# Patient Record
Sex: FEMALE | Race: WHITE
[De-identification: names, ages, dates, MRNs, and addresses within clinical notes are randomized per-mention and may not be internally consistent; named-entity substitution may affect disease eponyms.]

---

## 2017-10-18 ENCOUNTER — HOSPITAL ENCOUNTER (OUTPATIENT)
Dept: HOSPITAL 47 - LABWHC1 | Age: 52
Discharge: HOME | End: 2017-10-18
Payer: COMMERCIAL

## 2017-10-18 DIAGNOSIS — D72.829: Primary | ICD-10-CM

## 2017-10-18 LAB
ALP SERPL-CCNC: 92 U/L (ref 38–126)
ALT SERPL-CCNC: 46 U/L (ref 9–52)
ANION GAP SERPL CALC-SCNC: 11 MMOL/L
AST SERPL-CCNC: 22 U/L (ref 14–36)
BASOPHILS # BLD AUTO: 0.1 K/UL (ref 0–0.2)
BASOPHILS NFR BLD AUTO: 1 %
BUN SERPL-SCNC: 11 MG/DL (ref 7–17)
CALCIUM SPEC-MCNC: 9.7 MG/DL (ref 8.4–10.2)
CH: 31.2
CHCM: 31.8
CHLORIDE SERPL-SCNC: 102 MMOL/L (ref 98–107)
CO2 SERPL-SCNC: 22 MMOL/L (ref 22–30)
EOSINOPHIL # BLD AUTO: 0.5 K/UL (ref 0–0.7)
EOSINOPHIL NFR BLD AUTO: 4 %
ERYTHROCYTE [DISTWIDTH] IN BLOOD BY AUTOMATED COUNT: 4.4 M/UL (ref 3.8–5.4)
ERYTHROCYTE [DISTWIDTH] IN BLOOD: 13.6 % (ref 11.5–15.5)
GLUCOSE SERPL-MCNC: 199 MG/DL (ref 74–99)
HCT VFR BLD AUTO: 43.4 % (ref 34–46)
HDW: 2.37
HGB BLD-MCNC: 13.5 GM/DL (ref 11.4–16)
LUC NFR BLD AUTO: 2 %
LYMPHOCYTES # SPEC AUTO: 3.4 K/UL (ref 1–4.8)
LYMPHOCYTES NFR SPEC AUTO: 25 %
MCH RBC QN AUTO: 30.7 PG (ref 25–35)
MCHC RBC AUTO-ENTMCNC: 31.1 G/DL (ref 31–37)
MCV RBC AUTO: 98.7 FL (ref 80–100)
MONOCYTES # BLD AUTO: 0.9 K/UL (ref 0–1)
MONOCYTES NFR BLD AUTO: 7 %
NEUTROPHILS # BLD AUTO: 8.5 K/UL (ref 1.3–7.7)
NEUTROPHILS NFR BLD AUTO: 62 %
NON-AFRICAN AMERICAN GFR(MDRD): >60
POTASSIUM SERPL-SCNC: 4.1 MMOL/L (ref 3.5–5.1)
PROT SERPL-MCNC: 6.5 G/DL (ref 6.3–8.2)
SODIUM SERPL-SCNC: 135 MMOL/L (ref 137–145)
WBC # BLD AUTO: 0.33 10*3/UL
WBC # BLD AUTO: 13.7 K/UL (ref 3.8–10.6)
WBC (PEROX): 13.73

## 2017-10-18 PROCEDURE — 80053 COMPREHEN METABOLIC PANEL: CPT

## 2017-10-18 PROCEDURE — 36415 COLL VENOUS BLD VENIPUNCTURE: CPT

## 2017-10-18 PROCEDURE — 85025 COMPLETE CBC W/AUTO DIFF WBC: CPT

## 2018-11-09 ENCOUNTER — HOSPITAL ENCOUNTER (OUTPATIENT)
Dept: HOSPITAL 47 - LABWHC1 | Age: 53
Discharge: HOME | End: 2018-11-09
Attending: NURSE PRACTITIONER
Payer: COMMERCIAL

## 2018-11-09 DIAGNOSIS — R79.9: ICD-10-CM

## 2018-11-09 DIAGNOSIS — D72.829: Primary | ICD-10-CM

## 2018-11-09 LAB
BASOPHILS # BLD AUTO: 0.1 K/UL (ref 0–0.2)
BASOPHILS NFR BLD AUTO: 1 %
EOSINOPHIL # BLD AUTO: 0.4 K/UL (ref 0–0.7)
EOSINOPHIL NFR BLD AUTO: 3 %
ERYTHROCYTE [DISTWIDTH] IN BLOOD BY AUTOMATED COUNT: 4.58 M/UL (ref 3.8–5.4)
ERYTHROCYTE [DISTWIDTH] IN BLOOD: 13.7 % (ref 11.5–15.5)
HCT VFR BLD AUTO: 44 % (ref 34–46)
HGB BLD-MCNC: 14.2 GM/DL (ref 11.4–16)
LYMPHOCYTES # SPEC AUTO: 3.3 K/UL (ref 1–4.8)
LYMPHOCYTES NFR SPEC AUTO: 25 %
MCH RBC QN AUTO: 30.9 PG (ref 25–35)
MCHC RBC AUTO-ENTMCNC: 32.2 G/DL (ref 31–37)
MCV RBC AUTO: 96 FL (ref 80–100)
MONOCYTES # BLD AUTO: 0.8 K/UL (ref 0–1)
MONOCYTES NFR BLD AUTO: 6 %
NEUTROPHILS # BLD AUTO: 8.3 K/UL (ref 1.3–7.7)
NEUTROPHILS NFR BLD AUTO: 62 %
PLATELET # BLD AUTO: 336 K/UL (ref 150–450)
WBC # BLD AUTO: 13.3 K/UL (ref 3.8–10.6)

## 2018-11-09 PROCEDURE — 36415 COLL VENOUS BLD VENIPUNCTURE: CPT

## 2018-11-09 PROCEDURE — 85025 COMPLETE CBC W/AUTO DIFF WBC: CPT

## 2018-11-12 ENCOUNTER — HOSPITAL ENCOUNTER (OUTPATIENT)
Dept: HOSPITAL 47 - RADCTMAIN | Age: 53
Discharge: HOME | End: 2018-11-12
Attending: INTERNAL MEDICINE
Payer: COMMERCIAL

## 2018-11-12 DIAGNOSIS — Z88.8: ICD-10-CM

## 2018-11-12 DIAGNOSIS — R91.8: Primary | ICD-10-CM

## 2018-11-12 LAB — BUN SERPL-SCNC: 11 MG/DL (ref 7–17)

## 2018-11-12 PROCEDURE — 36415 COLL VENOUS BLD VENIPUNCTURE: CPT

## 2018-11-12 PROCEDURE — 84520 ASSAY OF UREA NITROGEN: CPT

## 2018-11-12 PROCEDURE — 82565 ASSAY OF CREATININE: CPT

## 2018-11-12 PROCEDURE — 71260 CT THORAX DX C+: CPT

## 2018-11-12 NOTE — CT
EXAMINATION TYPE: CT chest w con

 

DATE OF EXAM: 11/12/2018

 

COMPARISON: 10/27/2017

 

HISTORY: Pulmonary nodule

 

CT DLP: 627 mGycm, Automated exposure control for dose reduction was used.

 

CONTRAST: Performed injected with 100 mL of Isovue 300.

 

TECHNIQUE: Axial images were obtained at 5 mm thick sections.  Reconstructed images are reviewed on [employer] computer in the coronal plane. 

 

FINDINGS: Portion of the thyroid visualized is normal.

 

No suspicious infiltrates are evident. There is a 0.6 cm peripheral nodule series 3 image 25. There i
s a 0.5 cm pleural-based nodule. Series 3 image 40. Findings appear stable.

 

No enlarged mediastinal or hilar adenopathy is evident.   Subcentimeter lymph nodes are stable. The a
scending aorta diameter at the level of the main pulmonary artery is 2.9 cm.  The main pulmonary erin
ry diameter at the bifurcation is 2.7 cm.

 

Limited CT sections are obtained through the upper abdomen. Abdomen is essentially unremarkable.

 

IMPRESSIONS:

1. Stable subcentimeter nodules right lung. Follow-up exam can be performed in 6 months to confirm st
ability.

## 2019-04-17 ENCOUNTER — HOSPITAL ENCOUNTER (OUTPATIENT)
Dept: HOSPITAL 47 - LABWHC1 | Age: 54
Discharge: HOME | End: 2019-04-17
Attending: FAMILY MEDICINE
Payer: COMMERCIAL

## 2019-04-17 DIAGNOSIS — I10: ICD-10-CM

## 2019-04-17 DIAGNOSIS — Z00.00: Primary | ICD-10-CM

## 2019-04-17 DIAGNOSIS — Z79.4: ICD-10-CM

## 2019-04-17 DIAGNOSIS — E11.9: ICD-10-CM

## 2019-04-17 DIAGNOSIS — Z79.899: ICD-10-CM

## 2019-04-17 LAB
ALBUMIN SERPL-MCNC: 4.3 G/DL (ref 3.8–4.9)
ALBUMIN/GLOB SERPL: 2.87 G/DL (ref 1.6–3.17)
ALP SERPL-CCNC: 78 U/L (ref 41–126)
ALT SERPL-CCNC: 26 U/L (ref 8–44)
ANION GAP SERPL CALC-SCNC: 7.5 MMOL/L (ref 4–12)
AST SERPL-CCNC: 19 U/L (ref 13–35)
BASOPHILS # BLD AUTO: 0.1 K/UL (ref 0–0.2)
BASOPHILS NFR BLD AUTO: 0 %
BUN SERPL-SCNC: 13 MG/DL (ref 9–27)
CALCIUM SPEC-MCNC: 9.4 MG/DL (ref 8.7–10.3)
CHLORIDE SERPL-SCNC: 108 MMOL/L (ref 96–109)
CHOLEST SERPL-MCNC: 135 MG/DL (ref 0–200)
CO2 SERPL-SCNC: 24.5 MMOL/L (ref 21.6–31.8)
EOSINOPHIL # BLD AUTO: 0.4 K/UL (ref 0–0.7)
EOSINOPHIL NFR BLD AUTO: 3 %
ERYTHROCYTE [DISTWIDTH] IN BLOOD BY AUTOMATED COUNT: 4.67 M/UL (ref 3.8–5.4)
ERYTHROCYTE [DISTWIDTH] IN BLOOD: 14.3 % (ref 11.5–15.5)
GLOBULIN SER CALC-MCNC: 1.5 G/DL (ref 1.6–3.3)
GLUCOSE SERPL-MCNC: 142 MG/DL (ref 70–110)
HBA1C MFR BLD: 7.6 % (ref 4–6)
HCT VFR BLD AUTO: 44.6 % (ref 34–46)
HDLC SERPL-MCNC: 32 MG/DL (ref 40–60)
HGB BLD-MCNC: 14.3 GM/DL (ref 11.4–16)
LDLC SERPL CALC-MCNC: 70.4 MG/DL (ref 0–131)
LYMPHOCYTES # SPEC AUTO: 3.1 K/UL (ref 1–4.8)
LYMPHOCYTES NFR SPEC AUTO: 21 %
MCH RBC QN AUTO: 30.5 PG (ref 25–35)
MCHC RBC AUTO-ENTMCNC: 31.9 G/DL (ref 31–37)
MCV RBC AUTO: 95.6 FL (ref 80–100)
MONOCYTES # BLD AUTO: 0.8 K/UL (ref 0–1)
MONOCYTES NFR BLD AUTO: 6 %
NEUTROPHILS # BLD AUTO: 9.8 K/UL (ref 1.3–7.7)
NEUTROPHILS NFR BLD AUTO: 68 %
PLATELET # BLD AUTO: 335 K/UL (ref 150–450)
POTASSIUM SERPL-SCNC: 4.1 MMOL/L (ref 3.5–5.5)
PROT SERPL-MCNC: 5.8 G/DL (ref 6.2–8.2)
SODIUM SERPL-SCNC: 140 MMOL/L (ref 135–145)
T4 FREE SERPL-MCNC: 1.4 NG/DL (ref 0.8–1.8)
TRIGL SERPL-MCNC: 163 MG/DL (ref 0–149)
VLDLC SERPL CALC-MCNC: 32.6 MG/DL (ref 5–40)
WBC # BLD AUTO: 14.5 K/UL (ref 3.8–10.6)

## 2019-04-17 PROCEDURE — 83036 HEMOGLOBIN GLYCOSYLATED A1C: CPT

## 2019-04-17 PROCEDURE — 80053 COMPREHEN METABOLIC PANEL: CPT

## 2019-04-17 PROCEDURE — 84443 ASSAY THYROID STIM HORMONE: CPT

## 2019-04-17 PROCEDURE — 36415 COLL VENOUS BLD VENIPUNCTURE: CPT

## 2019-04-17 PROCEDURE — 85025 COMPLETE CBC W/AUTO DIFF WBC: CPT

## 2019-04-17 PROCEDURE — 80061 LIPID PANEL: CPT

## 2019-04-17 PROCEDURE — 84439 ASSAY OF FREE THYROXINE: CPT

## 2019-09-18 ENCOUNTER — HOSPITAL ENCOUNTER (OUTPATIENT)
Dept: HOSPITAL 47 - LABWHC1 | Age: 54
Discharge: HOME | End: 2019-09-18
Attending: CLINICAL NURSE SPECIALIST
Payer: COMMERCIAL

## 2019-09-18 DIAGNOSIS — F31.9: Primary | ICD-10-CM

## 2019-09-18 LAB
ALBUMIN SERPL-MCNC: 4.4 G/DL (ref 3.8–4.9)
ALBUMIN/GLOB SERPL: 2.44 G/DL (ref 1.6–3.17)
ALP SERPL-CCNC: 89 U/L (ref 41–126)
ALT SERPL-CCNC: 40 U/L (ref 8–44)
ANION GAP SERPL CALC-SCNC: 6.9 MMOL/L (ref 4–12)
AST SERPL-CCNC: 25 U/L (ref 13–35)
BASOPHILS # BLD AUTO: 0.2 K/UL (ref 0–0.2)
BASOPHILS NFR BLD AUTO: 1 %
BUN SERPL-SCNC: 18 MG/DL (ref 9–27)
BUN/CREAT SERPL: 18 RATIO (ref 12–20)
CALCIUM SPEC-MCNC: 9.5 MG/DL (ref 8.7–10.3)
CHLORIDE SERPL-SCNC: 108 MMOL/L (ref 96–109)
CHOLEST SERPL-MCNC: 148 MG/DL (ref 0–200)
CO2 SERPL-SCNC: 24.1 MMOL/L (ref 21.6–31.8)
EOSINOPHIL # BLD AUTO: 0.9 K/UL (ref 0–0.7)
EOSINOPHIL NFR BLD AUTO: 6 %
ERYTHROCYTE [DISTWIDTH] IN BLOOD BY AUTOMATED COUNT: 4.92 M/UL (ref 3.8–5.4)
ERYTHROCYTE [DISTWIDTH] IN BLOOD: 13.7 % (ref 11.5–15.5)
GLOBULIN SER CALC-MCNC: 1.8 G/DL (ref 1.6–3.3)
GLUCOSE SERPL-MCNC: 147 MG/DL (ref 70–110)
HBA1C MFR BLD: 7.1 % (ref 4–6)
HCT VFR BLD AUTO: 46.5 % (ref 34–46)
HDLC SERPL-MCNC: 38 MG/DL (ref 40–60)
HGB BLD-MCNC: 15.6 GM/DL (ref 11.4–16)
LDLC SERPL CALC-MCNC: 72.2 MG/DL (ref 0–131)
LYMPHOCYTES # SPEC AUTO: 3.1 K/UL (ref 1–4.8)
LYMPHOCYTES NFR SPEC AUTO: 21 %
MCH RBC QN AUTO: 31.8 PG (ref 25–35)
MCHC RBC AUTO-ENTMCNC: 33.6 G/DL (ref 31–37)
MCV RBC AUTO: 94.5 FL (ref 80–100)
MONOCYTES # BLD AUTO: 0.9 K/UL (ref 0–1)
MONOCYTES NFR BLD AUTO: 6 %
NEUTROPHILS # BLD AUTO: 9.4 K/UL (ref 1.3–7.7)
NEUTROPHILS NFR BLD AUTO: 64 %
PLATELET # BLD AUTO: 384 K/UL (ref 150–450)
POTASSIUM SERPL-SCNC: 4.4 MMOL/L (ref 3.5–5.5)
PROT SERPL-MCNC: 6.2 G/DL (ref 6.2–8.2)
SODIUM SERPL-SCNC: 139 MMOL/L (ref 135–145)
T3RU NFR SERPL: 29 % (ref 23–37)
T4 FREE SERPL-MCNC: 1.3 NG/DL (ref 0.8–1.8)
TRIGL SERPL-MCNC: 189 MG/DL (ref 0–149)
VLDLC SERPL CALC-MCNC: 37.8 MG/DL (ref 5–40)
WBC # BLD AUTO: 14.8 K/UL (ref 3.8–10.6)

## 2019-09-18 PROCEDURE — 82248 BILIRUBIN DIRECT: CPT

## 2019-09-18 PROCEDURE — 36415 COLL VENOUS BLD VENIPUNCTURE: CPT

## 2019-09-18 PROCEDURE — 84443 ASSAY THYROID STIM HORMONE: CPT

## 2019-09-18 PROCEDURE — 82306 VITAMIN D 25 HYDROXY: CPT

## 2019-09-18 PROCEDURE — 93005 ELECTROCARDIOGRAM TRACING: CPT

## 2019-09-18 PROCEDURE — 80061 LIPID PANEL: CPT

## 2019-09-18 PROCEDURE — 84479 ASSAY OF THYROID (T3 OR T4): CPT

## 2019-09-18 PROCEDURE — 80053 COMPREHEN METABOLIC PANEL: CPT

## 2019-09-18 PROCEDURE — 85025 COMPLETE CBC W/AUTO DIFF WBC: CPT

## 2019-09-18 PROCEDURE — 84146 ASSAY OF PROLACTIN: CPT

## 2019-09-18 PROCEDURE — 84439 ASSAY OF FREE THYROXINE: CPT

## 2019-09-18 PROCEDURE — 83036 HEMOGLOBIN GLYCOSYLATED A1C: CPT

## 2019-10-08 ENCOUNTER — HOSPITAL ENCOUNTER (OUTPATIENT)
Dept: HOSPITAL 47 - RADMAMWWP | Age: 54
Discharge: HOME | End: 2019-10-08
Attending: FAMILY MEDICINE
Payer: COMMERCIAL

## 2019-10-08 DIAGNOSIS — Z12.31: Primary | ICD-10-CM

## 2019-10-08 DIAGNOSIS — Z78.0: ICD-10-CM

## 2019-10-08 DIAGNOSIS — Z80.3: ICD-10-CM

## 2019-10-08 PROCEDURE — 77067 SCR MAMMO BI INCL CAD: CPT

## 2019-10-08 PROCEDURE — 77063 BREAST TOMOSYNTHESIS BI: CPT

## 2019-10-11 NOTE — MM
Reason for exam: screening  (asymptomatic).

Last mammogram was performed 5 years and 6 months ago.



History:

Patient is postmenopausal.

Family history of breast cancer in maternal aunt.



Physical Findings:

A clinical breast exam by your physician is recommended on an annual basis and 

results should be correlated with mammographic findings.



MG 3D Screening Mammo W/Cad

Bilateral CC and MLO view(s) were taken.

Prior study comparison: March 24, 2014, bilateral digital screening mammo w/CAD.

The breast tissue is heterogeneously dense. This may lower the sensitivity of 

mammography.  No significant changes when compared with prior studies.





ASSESSMENT: Benign, BI-RAD 2



RECOMMENDATION:

Routine screening mammogram of both breasts in 1 year.

## 2019-10-18 ENCOUNTER — HOSPITAL ENCOUNTER (OUTPATIENT)
Dept: HOSPITAL 47 - RADCTMAIN | Age: 54
Discharge: HOME | End: 2019-10-18
Attending: FAMILY MEDICINE
Payer: COMMERCIAL

## 2019-10-18 DIAGNOSIS — G31.1: Primary | ICD-10-CM

## 2019-10-18 DIAGNOSIS — Z88.8: ICD-10-CM

## 2019-10-18 PROCEDURE — 82565 ASSAY OF CREATININE: CPT

## 2019-10-18 PROCEDURE — 84520 ASSAY OF UREA NITROGEN: CPT

## 2019-10-18 PROCEDURE — 70470 CT HEAD/BRAIN W/O & W/DYE: CPT

## 2019-10-18 NOTE — CT
EXAMINATION TYPE: CT brain wo/w con

 

DATE OF EXAM: 10/18/2019

 

COMPARISON: None.

 

HISTORY: Abnormal clinical finding per patient and order.

 

CT DLP: 2180.80 mGycm

Automated exposure control for dose reduction was used.

 

CONTRAST: 

CT scan of the head is performed without and with IV Contrast, patient injected with 80 ml mL of Isov
ue 300.

 

FINDINGS: 

Noncontrast images show no acute intracranial hemorrhage or midline shift. Mild age-related cerebral 
atrophy. Gray-white matter differentiation is maintained. Postcontrast images show no suspicious enha
ncing mass. Hypoplastic right A1 segment with filling of right A2 segment due to patent anterior comm
unicating artery incidentally noted. Normal variant. The globes are intact and the visualized sinuses
 are clear.

 

IMPRESSION: Mild age-related cerebral atrophy. No hydrocephalus or suspicious enhancing mass.

## 2019-12-18 ENCOUNTER — HOSPITAL ENCOUNTER (OUTPATIENT)
Dept: HOSPITAL 47 - RADCTMAIN | Age: 54
Discharge: HOME | End: 2019-12-18
Attending: INTERNAL MEDICINE
Payer: COMMERCIAL

## 2019-12-18 DIAGNOSIS — Z12.2: Primary | ICD-10-CM

## 2019-12-18 DIAGNOSIS — Z87.891: ICD-10-CM

## 2019-12-18 NOTE — CTL
EXAMINATION TYPE:  CT Low Dose Lung

 

DATE OF EXAM ORDERED: 12/18/2019

 

HISTORY: . Lung cancer screening

 

CT DLP: 53 mGycm

CT CTDI: 1.68 mGy

Automated exposure control for dose reduction was used.

 

SCREENING VISIT: Initial

 

COMPARISON: 11/12/2018

 

TECHNIQUE: Low dose computed tomography scan was performed through the chest at 1 mm thick sections a
nd reconstructed images in the coronal plane at 1 mm thick sections.

 

CT DIAGNOSTIC QUALITY: Limited, but interpretable

 

FINDINGS:

LUNG NODULES: None.

 

LUNGS:

COPD: Severity: None

Fibrosis: Severity: None

Lymph nodes: None

Other findings: None

 

RIGHT PLEURAL SPACE:

Effusion: None

Calcification: None

Thickening: None

Pneumothorax: None

 

LEFT PLEURAL SPACE:

Effusion: None

Calcification: None

Thickening: None

Pneumothorax: None

 

HEART:  

Heart Size: Normal

Coronary calcification: Mild

Pericardial effusion: None

 

OTHER FINDINGS:

Upper abdomen: Unremarkable

Bony thorax: Unremarkable

Supraclavicular region: Normal

Other: Ascending thoracic aorta at the level the main pulmonary artery measures 3.5 cm.  The main pul
monary artery at the bifurcation measures 3.0 cm.

 

IMPRESSION: No suspicious acute changes

 

FOLLOW UP CT CHEST RECOMMENDATION: Screening low-dose CT chest for protocol

CT LUNG RAD: 1

## 2020-01-17 ENCOUNTER — HOSPITAL ENCOUNTER (OUTPATIENT)
Dept: HOSPITAL 47 - LABPAT | Age: 55
Discharge: HOME | End: 2020-01-17
Attending: INTERNAL MEDICINE
Payer: COMMERCIAL

## 2020-01-17 DIAGNOSIS — Z01.812: Primary | ICD-10-CM

## 2020-01-17 DIAGNOSIS — R07.2: ICD-10-CM

## 2020-01-17 LAB
ANION GAP SERPL CALC-SCNC: 10 MMOL/L
BASOPHILS # BLD AUTO: 0.1 K/UL (ref 0–0.2)
BASOPHILS NFR BLD AUTO: 1 %
BUN SERPL-SCNC: 15 MG/DL (ref 7–17)
CHLORIDE SERPL-SCNC: 105 MMOL/L (ref 98–107)
CO2 SERPL-SCNC: 25 MMOL/L (ref 22–30)
EOSINOPHIL # BLD AUTO: 0.3 K/UL (ref 0–0.7)
EOSINOPHIL NFR BLD AUTO: 3 %
ERYTHROCYTE [DISTWIDTH] IN BLOOD BY AUTOMATED COUNT: 5.1 M/UL (ref 3.8–5.4)
ERYTHROCYTE [DISTWIDTH] IN BLOOD: 14.1 % (ref 11.5–15.5)
HCT VFR BLD AUTO: 49 % (ref 34–46)
HGB BLD-MCNC: 15.3 GM/DL (ref 11.4–16)
LYMPHOCYTES # SPEC AUTO: 2.1 K/UL (ref 1–4.8)
LYMPHOCYTES NFR SPEC AUTO: 21 %
MCH RBC QN AUTO: 30 PG (ref 25–35)
MCHC RBC AUTO-ENTMCNC: 31.2 G/DL (ref 31–37)
MCV RBC AUTO: 96 FL (ref 80–100)
MONOCYTES # BLD AUTO: 0.8 K/UL (ref 0–1)
MONOCYTES NFR BLD AUTO: 8 %
NEUTROPHILS # BLD AUTO: 6.5 K/UL (ref 1.3–7.7)
NEUTROPHILS NFR BLD AUTO: 65 %
PLATELET # BLD AUTO: 403 K/UL (ref 150–450)
POTASSIUM SERPL-SCNC: 4.4 MMOL/L (ref 3.5–5.1)
SODIUM SERPL-SCNC: 140 MMOL/L (ref 137–145)
WBC # BLD AUTO: 10.1 K/UL (ref 3.8–10.6)

## 2020-01-17 PROCEDURE — 82565 ASSAY OF CREATININE: CPT

## 2020-01-17 PROCEDURE — 80051 ELECTROLYTE PANEL: CPT

## 2020-01-17 PROCEDURE — 84520 ASSAY OF UREA NITROGEN: CPT

## 2020-01-17 PROCEDURE — 85025 COMPLETE CBC W/AUTO DIFF WBC: CPT

## 2020-01-22 ENCOUNTER — HOSPITAL ENCOUNTER (OUTPATIENT)
Dept: HOSPITAL 47 - CATHCVL | Age: 55
End: 2020-01-22
Attending: INTERNAL MEDICINE
Payer: COMMERCIAL

## 2020-01-22 VITALS — TEMPERATURE: 97.6 F

## 2020-01-22 VITALS — BODY MASS INDEX: 26.7 KG/M2

## 2020-01-22 VITALS — SYSTOLIC BLOOD PRESSURE: 114 MMHG | DIASTOLIC BLOOD PRESSURE: 82 MMHG

## 2020-01-22 VITALS — RESPIRATION RATE: 16 BRPM

## 2020-01-22 VITALS — HEART RATE: 71 BPM

## 2020-01-22 DIAGNOSIS — Z82.49: ICD-10-CM

## 2020-01-22 DIAGNOSIS — R79.89: ICD-10-CM

## 2020-01-22 DIAGNOSIS — Z79.890: ICD-10-CM

## 2020-01-22 DIAGNOSIS — F17.210: ICD-10-CM

## 2020-01-22 DIAGNOSIS — R07.89: ICD-10-CM

## 2020-01-22 DIAGNOSIS — Z79.84: ICD-10-CM

## 2020-01-22 DIAGNOSIS — I25.10: Primary | ICD-10-CM

## 2020-01-22 DIAGNOSIS — E11.9: ICD-10-CM

## 2020-01-22 DIAGNOSIS — Z79.899: ICD-10-CM

## 2020-01-22 DIAGNOSIS — Z88.8: ICD-10-CM

## 2020-01-22 DIAGNOSIS — I77.89: ICD-10-CM

## 2020-01-22 DIAGNOSIS — E78.2: ICD-10-CM

## 2020-01-22 DIAGNOSIS — I10: ICD-10-CM

## 2020-01-22 LAB — GLUCOSE BLD-MCNC: 166 MG/DL (ref 75–99)

## 2020-01-22 PROCEDURE — 93458 L HRT ARTERY/VENTRICLE ANGIO: CPT

## 2020-01-22 RX ADMIN — MIDAZOLAM ONE MG: 1 INJECTION INTRAMUSCULAR; INTRAVENOUS at 07:34

## 2020-01-22 RX ADMIN — CEFAZOLIN ONE MLS: 330 INJECTION, POWDER, FOR SOLUTION INTRAMUSCULAR; INTRAVENOUS at 06:25

## 2020-01-22 RX ADMIN — CEFAZOLIN ONE MLS: 330 INJECTION, POWDER, FOR SOLUTION INTRAMUSCULAR; INTRAVENOUS at 06:39

## 2020-01-22 RX ADMIN — MIDAZOLAM ONE MG: 1 INJECTION INTRAMUSCULAR; INTRAVENOUS at 07:35

## 2020-01-22 NOTE — LTR
January 22, 2020





Re: Ayana Henao



Dear Roger:



I performed cardiac catheterization on Ayana Henao. A detailed catheterization note

is enclosed for your records.



In brief, the cardiac catheterization reveals mild nonobstructive coronary artery

disease and her management is going to be in the form of risk factor modification and

optimal medical therapy.



Thank you for giving us the privilege to participate in the care of this pleasant lady.



Sincerely,







MD ADRIAN Forde / BALDEV: [id number] / Job#: [id number]

## 2020-01-22 NOTE — CC
CARDIAC CATHETERIZATION REPORT



INDICATION:

Unstable angina.



The patient with multiple coronary risk factors presented to us with symptoms of

precordial chest pain.  She had a negative stress test a few months ago, but has had

recurrent episodes of chest pressure concerning for unstable angina.  Due to this, I

advised her to undergo cardiac catheterization for definitive diagnosis.  While we were

waiting for insurance approval, she had another episode of chest pain and visited the

ER, but was not evaluated as she could not wait.



PROCEDURE NOTE:

After obtaining informed consent, left heart catheterization and coronary angiogram

were performed via the right femoral artery using standard Nazario catheters. The

patient tolerated the procedure well without any obvious immediate complications.



Patient received moderate conscious sedation.  Total sedation time was 13 minutes.  A

femoral angiogram was performed and Angio-Seal was deployed for hemostasis.



FINDINGS:

1. HEMODYNAMICS: Left ventricular end-diastolic pressure is 18 mm.  There is no

    significant gradient across the aortic valve.

2. LEFT VENTRICULOGRAM: Left ventriculogram is not performed.

3. ANGIOGRAPHIC DATA:

Left Main Coronary Artery: Left main coronary artery is a normal-sized vessel and is

free of stenosis.  Divides into left anterior descending coronary artery and circumflex

coronary artery.

LAD shows a mild nonobstructive plaque in the proximal part.

Circumflex coronary artery shows areas that are irregular and ectatic.

Right coronary artery is a small nondominant vessel that shows mild nonobstructive

disease.



CONCLUSION:

Mild nonobstructive coronary artery disease involving LAD and circumflex coronary

artery.



PLAN:

I reviewed angiographic data with the patient and told her that her chest discomfort is

noncardiac in origin and her management is going to be in the form of risk factor

modification optimal medical therapy.

The patient has elevated creatinine and she has been hydrated prior to cath.





MMODL / IJN: [id number] / Job#: [id number]

## 2020-02-10 ENCOUNTER — HOSPITAL ENCOUNTER (OUTPATIENT)
Dept: HOSPITAL 47 - ORWHC2ENDO | Age: 55
Discharge: HOME | End: 2020-02-10
Attending: SURGERY
Payer: COMMERCIAL

## 2020-02-10 VITALS — TEMPERATURE: 98.6 F

## 2020-02-10 VITALS — BODY MASS INDEX: 26.7 KG/M2

## 2020-02-10 VITALS — RESPIRATION RATE: 18 BRPM | SYSTOLIC BLOOD PRESSURE: 133 MMHG | DIASTOLIC BLOOD PRESSURE: 88 MMHG

## 2020-02-10 VITALS — HEART RATE: 77 BPM

## 2020-02-10 DIAGNOSIS — E07.9: ICD-10-CM

## 2020-02-10 DIAGNOSIS — Z99.89: ICD-10-CM

## 2020-02-10 DIAGNOSIS — K21.9: ICD-10-CM

## 2020-02-10 DIAGNOSIS — F41.9: ICD-10-CM

## 2020-02-10 DIAGNOSIS — F31.9: ICD-10-CM

## 2020-02-10 DIAGNOSIS — Z79.82: ICD-10-CM

## 2020-02-10 DIAGNOSIS — Z88.8: ICD-10-CM

## 2020-02-10 DIAGNOSIS — Z79.4: ICD-10-CM

## 2020-02-10 DIAGNOSIS — K59.00: ICD-10-CM

## 2020-02-10 DIAGNOSIS — Z79.890: ICD-10-CM

## 2020-02-10 DIAGNOSIS — J44.9: ICD-10-CM

## 2020-02-10 DIAGNOSIS — I10: ICD-10-CM

## 2020-02-10 DIAGNOSIS — F17.210: ICD-10-CM

## 2020-02-10 DIAGNOSIS — E11.9: ICD-10-CM

## 2020-02-10 DIAGNOSIS — Z98.890: ICD-10-CM

## 2020-02-10 DIAGNOSIS — I20.9: ICD-10-CM

## 2020-02-10 DIAGNOSIS — E78.5: ICD-10-CM

## 2020-02-10 DIAGNOSIS — Z79.899: ICD-10-CM

## 2020-02-10 DIAGNOSIS — G47.30: ICD-10-CM

## 2020-02-10 DIAGNOSIS — K57.31: Primary | ICD-10-CM

## 2020-02-10 LAB — GLUCOSE BLD-MCNC: 165 MG/DL (ref 75–99)

## 2020-02-10 PROCEDURE — 45378 DIAGNOSTIC COLONOSCOPY: CPT

## 2020-02-10 RX ADMIN — POTASSIUM CHLORIDE SCH MLS: 14.9 INJECTION, SOLUTION INTRAVENOUS at 07:41

## 2020-02-10 RX ADMIN — POTASSIUM CHLORIDE SCH MLS: 14.9 INJECTION, SOLUTION INTRAVENOUS at 07:44

## 2020-02-10 NOTE — P.GSHP
History of Present Illness


H&P Date: 02/10/20


Chief Complaint: GI bleed





This a 54-year-old female who presents today for colonoscopy.  Patient had a 

positive occult blood.  She presents today for colonoscopy.





Past Medical History


Past Medical History: Chest Pain / Angina, COPD, Diabetes Mellitus, GERD/Reflux,

Hyperlipidemia, Hypertension, Sleep Apnea/CPAP/BIPAP, Thyroid Disorder


Additional Past Medical History / Comment(s): constipation,uses CPAP


History of Any Multi-Drug Resistant Organisms: None Reported


Past Surgical History: Heart Catheterization, Hernia Repair, Uterine Ablation


Additional Past Surgical History / Comment(s): LAPAROSCOPY.  UMB HERNIA


Past Anesthesia/Blood Transfusion Reactions: No Reported Reaction, Motion 

Sickness


Smoking Status: Current every day smoker





- Past Family History


  ** Mother


Family Medical History: No Reported History





Medications and Allergies


                                Home Medications











 Medication  Instructions  Recorded  Confirmed  Type


 


Albuterol Sulfate [Ventolin HFA] 1 - 2 puff INHALATION Q6H PRN 01/20/20 02/10/20

History


 


Atorvastatin [Lipitor] 80 mg PO HS 01/20/20 02/10/20 History


 


Dulaglutide [Trulicity] 1.5 mg SQ WE 01/20/20 02/10/20 History


 


Empagliflozin [Jardiance] 25 mg PO DAILY 01/20/20 02/10/20 History


 


Haloperidol [Haldol] 5 mg PO QID 01/20/20 02/10/20 History


 


Insulin Glargine,Hum.rec.anlog 20 unit SQ AC-SUPPER 01/20/20 02/10/20 History





[Basaglar Kwikpen U-100]    


 


Levothyroxine Sodium [Synthroid] 100 mcg PO QAM 01/20/20 02/10/20 History


 


Lisinopril [Zestril] 2.5 mg PO QAM 01/20/20 02/10/20 History


 


OLANZapine [ZyPREXA] 20 mg PO QAM 01/20/20 02/10/20 History


 


Pantoprazole Sodium [Protonix] 40 mg PO QAM 01/20/20 02/10/20 History


 


Sertraline [Zoloft] 100 mg PO BID 01/20/20 02/10/20 History


 


Topiramate [Topamax] 200 mg PO 1200,2200 01/20/20 02/10/20 History


 


clonazePAM [KlonoPIN] 1 mg PO BID 01/20/20 02/10/20 History


 


diphenhydrAMINE HCL [Benadryl] 25 mg PO HS 01/20/20 02/10/20 History


 


metFORMIN HCL 1,000 mg PO BID 01/20/20 02/10/20 History


 


Aspirin 81 mg PO DAILY 01/21/20 02/10/20 History








                                    Allergies











Allergy/AdvReac Type Severity Reaction Status Date / Time


 


desvenlafaxine [From Pristiq] AdvReac  Itching Verified 02/10/20 07:16


 


lamotrigine [From Lamictal] AdvReac  Itching Verified 02/10/20 07:16














Surgical - Exam


                                   Vital Signs











Temp Pulse Resp BP Pulse Ox


 


 98.6 F   101 H  16   124/83   97 


 


 02/10/20 07:24  02/10/20 07:24  02/10/20 07:24  02/10/20 07:24  02/10/20 07:24














- General


well developed, well nourished, no distress





- Eyes


PERRL





- ENT


normal pinna





- Neck


no masses





- Respiratory


normal expansion





- Cardiovascular


Rhythm: regular





- Abdomen


Abdomen: soft, non tender





Results





- Labs


                  Abnormal Lab Results - Last 24 Hours (Table)











  02/10/20 Range/Units





  07:38 


 


POC Glucose (mg/dL)  165 H  (75-99)  mg/dL














Assessment and Plan


Assessment: 





GI bleed.  We'll perform colonoscopy.

## 2020-02-10 NOTE — P.OP
Date of Procedure: 02/10/20


Preoperative Diagnosis: 


GI bleed


Postoperative Diagnosis: 


Diverticulosis


Procedure(s) Performed: 


Colonoscopy


Anesthesia: MAC


Surgeon: Kvng Rojas


Pathology: none sent


Condition: stable


Disposition: PACU


Description of Procedure: 


Patient's placed on the endoscopy table in the lateral position.  She received 

IV sedation.  Digital rectal exam was performed which revealed no rebound.  The 

flexible colonoscope was then placed patient anus passed throughout the entire 

colon.  The ileocecal valve was visualized.  The cecum, ascending and transverse

colon appeared normal.  In the descending; was mild scattered diverticula.  

Scope was then brought back the rectum and this appeared normal.  Scope was wi

thdrawn for patient.  There is no evidence of GI bleed.  It is presumed patient 

may have had bleeding from her diverticula.

## 2021-02-22 ENCOUNTER — HOSPITAL ENCOUNTER (OUTPATIENT)
Dept: HOSPITAL 47 - RADCTMAIN | Age: 56
End: 2021-02-22
Attending: INTERNAL MEDICINE
Payer: COMMERCIAL

## 2021-02-22 DIAGNOSIS — Z12.2: Primary | ICD-10-CM

## 2021-02-22 DIAGNOSIS — F17.210: ICD-10-CM

## 2021-02-22 PROCEDURE — 71271 CT THORAX LUNG CANCER SCR C-: CPT

## 2021-02-23 NOTE — CTL
EXAMINATION TYPE:  CT Low Dose Lung

 

DATE OF EXAM ORDERED: 2/22/2021

 

HISTORY: Personal history of tobacco use. Lung cancer screening

 

CT DLP: 119 mGycm

CT CTDI: 3.2 mGy

Automated exposure control for dose reduction was used.

 

SCREENING VISIT: 2

 

COMPARISON: Previous chest CT 11/12/2018, CT 12/18/2019

 

TECHNIQUE: Low dose computed tomography scan was performed through the chest at 1 mm thick sections a
nd reconstructed images in the coronal plane at 1 mm thick sections.

 

CT DIAGNOSTIC QUALITY: Satisfactory

 

FINDINGS:

LUNG NODULES: Present, detailed below:

 

3 mm nodules are present in the right upper lobe, axial image 54 and 50, possibly present on previous
 exams. Subpleural nodule superior segment right lower lobe on axial image 109 is stable measuring 6 
mm.

 

The left upper lobe on axial image 65 shows a 3 mm nodule, axial image 101 shows a stable smooth erinn
in 5 mm spherical nodule, subpleural nodule measures 3 mm on axial image 180 

 

LUNGS:

COPD: Severity: Mild

Fibrosis: Severity: Mild

Lymph nodes: None

Other findings:

 

RIGHT PLEURAL SPACE:

Effusion: None

Calcification: None

Thickening: None

Pneumothorax: None

 

LEFT PLEURAL SPACE:

Effusion: None

Calcification: None

Thickening: None

Pneumothorax: None

 

HEART:  

Heart Size: Normal

Coronary calcification: Mild

Pericardial effusion: None

 

OTHER FINDINGS:

Upper abdomen: Some nonobstructive left renal calculi present at the upper pole which are partially v
isualized

Bony thorax: Within normal limits, there is thoracic spondylosis

Supraclavicular region: Unremarkable

Other:

 

IMPRESSION: Benign

 

CT LUNG RAD AND CT CHEST RECOMMENDATION: Lung-Rad 2 Benign Appearance or Behavior: Continue annual sc
reening with LDCT in 12 months.

 

S Modifier (other clinically significant findings):

## 2021-04-29 ENCOUNTER — HOSPITAL ENCOUNTER (OUTPATIENT)
Dept: HOSPITAL 47 - RADMAMWWP | Age: 56
Discharge: HOME | End: 2021-04-29
Attending: FAMILY MEDICINE
Payer: COMMERCIAL

## 2021-04-29 DIAGNOSIS — R92.1: Primary | ICD-10-CM

## 2021-04-29 DIAGNOSIS — Z78.0: ICD-10-CM

## 2021-04-29 DIAGNOSIS — Z80.3: ICD-10-CM

## 2021-04-29 PROCEDURE — 77066 DX MAMMO INCL CAD BI: CPT

## 2021-04-29 PROCEDURE — 77062 BREAST TOMOSYNTHESIS BI: CPT

## 2021-04-29 NOTE — USB
Reason for exam: additional evaluation requested from abnormal screening.



History:

Patient is postmenopausal.

Family history of breast cancer in maternal aunt.



US Breast Limited BILAT

Technologist: Piedad Abbott

Right limited breast ultrasound including focal area of concern, retroareolar and 

axilla demonstrates a duct at the posterior nipple.



Left limited breast ultrasound including focal area of concern, retroareolar and 

axilla demonstrates a duct with debris at the posterior nipple.



These results were verbally communicated with the patient and result sheet given 

to the patient on 4/29/21.





ASSESSMENT: Suspicious, BI-RAD 4



RECOMMENDATION:

Surgical consultation of both breasts.



Called Dr. Lay's office with mammographic findings and has scheduled an 

appointment for the patient for 5/14/21 at 11:00 with Dr. Alexandre.





PRELIMINARY REPORT CALLED AND FAXED TO DR. ALEXANDRE ON 4/29/21.

## 2021-04-29 NOTE — MM
Reason for exam: additional evaluation requested from prior study.

Last mammogram was performed 1 year and 7 months ago.



History:

Patient is postmenopausal.

Family history of breast cancer in maternal aunt.



Physical Findings:

Nurse did not find any significant physical abnormalities on exam.



MG 3D Diag Mammo W/Cad SARWAT

Bilateral CC and MLO view(s) were taken.

Prior study comparison: October 8, 2019, bilateral MG 3d screening mammo w/cad.  

March 24, 2014, bilateral digital screening mammo w/CAD.

The breast tissue is heterogeneously dense. This may lower the sensitivity of 

mammography.

Finding: There are typically benign calcifications in both breasts.



These results were verbally communicated with the patient and result sheet given 

to the patient on 4/29/21.





ASSESSMENT: Incomplete: need additional imaging evaluation, BI-RAD 0



RECOMMENDATION:

Ultrasound of both breasts.

## 2021-05-14 ENCOUNTER — HOSPITAL ENCOUNTER (OUTPATIENT)
Dept: HOSPITAL 47 - WWCWWP | Age: 56
End: 2021-05-14
Attending: SURGERY
Payer: COMMERCIAL

## 2021-05-14 VITALS
RESPIRATION RATE: 18 BRPM | DIASTOLIC BLOOD PRESSURE: 84 MMHG | HEART RATE: 86 BPM | TEMPERATURE: 98.1 F | SYSTOLIC BLOOD PRESSURE: 126 MMHG

## 2021-05-14 DIAGNOSIS — F17.210: ICD-10-CM

## 2021-05-14 DIAGNOSIS — I10: ICD-10-CM

## 2021-05-14 DIAGNOSIS — E03.9: ICD-10-CM

## 2021-05-14 DIAGNOSIS — K21.9: ICD-10-CM

## 2021-05-14 DIAGNOSIS — F31.9: ICD-10-CM

## 2021-05-14 DIAGNOSIS — N60.12: ICD-10-CM

## 2021-05-14 DIAGNOSIS — Z79.4: ICD-10-CM

## 2021-05-14 DIAGNOSIS — E11.9: ICD-10-CM

## 2021-05-14 DIAGNOSIS — J44.9: ICD-10-CM

## 2021-05-14 DIAGNOSIS — E78.5: ICD-10-CM

## 2021-05-14 DIAGNOSIS — N60.11: Primary | ICD-10-CM

## 2021-05-14 NOTE — P.GSHP
History of Present Illness


H&P Date: 21


Chief Complaint: bilateral nipple discharge





     Ayana is a 55 year old white female seen in consultation for Dr. Stromberg

regarding bilateral nipple discharge.  The discharge is white and yellow in 

color.  She has not noted any blood.  She has not noted any lumps masses or 

nodules in her breast.  She states that her breasts are sore but no real pain.  

She had a bilateral mammogram performed on 420 921.  This was felt to be 

incomplete and bilateral breast ultrasound was recommended.  On bilateral 

ultrasound the findings were as follows,


Right breast: Focal area of concern demonstrates a dilated duct at the posterior

nipple


Left breast ultrasound: Focal area of concern dilated duct at the posterior 

nipple


Surgical consultation was recommended.  The patient also had a lung CT performed

on 220 221 this was felt to be benign in annual screening was recommended.


The patient states she has had the nipple discharge for about 10 years. It 

recently changed in color to more yellow. It only comes with manipulation, it is

not spontaneous.  





Caffiene: 3 cups/day


nicotine: 1 PPD/41 years


chocolate: rare





Family history:


Paternal aunt: Breast cancer


Mother: Uterine cancer


Maternal grandmother: Uterine cancer








Hormonal History:


menarche: 11


, breast fed: no, age at birth: 24


menopause: ablation at 40


BCP: not used


hormones: none





Past surgical history:


laproscpic exam


Umbilical hernia repair





Medical history:


emphysema


bipolar


DM


hypothyroid





Social history:


Nicotine: One pack per day for 41 years


Alcohol: Negative


Drugs: Negative











- Constitutional


Constitutional: Denies chills, Denies fever





- EENT


Eyes: denies blurred vision, denies pain


Ears: bilateral: tinnitus, deny: decreased hearing


Ears, nose, mouth and throat: Reports sore throat, Denies headache





- Breasts


Breasts: bilateral: as per HPI





- Cardiovascular


Cardiovascular: Denies chest pain, Denies shortness of breath





- Respiratory


Comment: 





smoker





- Gastrointestinal


Gastrointestinal: Denies abdominal pain, Denies diarrhea, Denies nausea, Denies 

vomiting





- Genitourinary (Female)


Genitourinary: Reports kidney stones





- Menstruation


Menstruation: Reports postmenopausal





- Musculoskeletal


Musculoskeletal: Denies myalgias





- Integumentary


Integumentary: Denies pruritus, Denies rash





- Neurological


Neurological: Denies numbness, Denies weakness





- Psychiatric


Comment: 





bipolar





- Endocrine


Endocrine: Reports fatigue, Denies weight change





- Hematologic/Lymphatic


Comment: 





none





- Allergic/Immunologic


Allergic/Immunologic: Reports as per HPI





Past Medical History


Past Medical History: Chest Pain / Angina, COPD, Diabetes Mellitus, GERD/Reflux,

Hyperlipidemia, Hypertension, Sleep Apnea/CPAP/BIPAP, Thyroid Disorder


Additional Past Medical History / Comment(s): constipation,uses CPAP


History of Any Multi-Drug Resistant Organisms: None Reported


Past Surgical History: Heart Catheterization, Hernia Repair, Uterine Ablation


Additional Past Surgical History / Comment(s): LAPAROSCOPY.  UMB HERNIA


Past Anesthesia/Blood Transfusion Reactions: No Reported Reaction, Motion 

Sickness


Past Psychological History: Bipolar, Depression


Smoking Status: Current every day smoker


Past Alcohol Use History: None Reported


Additional Past Alcohol Use History / Comment(s): Started smoking at age 14-

1PPD.


Past Drug Use History: None Reported





- Past Family History


  ** Mother


Family Medical History: No Reported History





Medications and Allergies


                                Home Medications











 Medication  Instructions  Recorded  Confirmed  Type


 


Albuterol Sulfate [Ventolin HFA] 1 - 2 puff INHALATION Q6H PRN 01/20/20 02/10/20

History


 


Atorvastatin [Lipitor] 80 mg PO HS 01/20/20 02/10/20 History


 


Dulaglutide [Trulicity] 1.5 mg SQ WE 01/20/20 02/10/20 History


 


Empagliflozin [Jardiance] 25 mg PO DAILY 01/20/20 02/10/20 History


 


Insulin Glargine,Hum.rec.anlog 20 unit SQ AC-SUPPER 01/20/20 02/10/20 History





[Basaglar Kwikpen U-100]    


 


Levothyroxine Sodium [Synthroid] 100 mcg PO QAM 01/20/20 02/10/20 History


 


OLANZapine [ZyPREXA] 20 mg PO QAM 01/20/20 02/10/20 History


 


Pantoprazole Sodium [Protonix] 40 mg PO QAM 01/20/20 02/10/20 History


 


Sertraline [Zoloft] 100 mg PO BID 01/20/20 02/10/20 History


 


Topiramate [Topamax] 200 mg PO 1200,2200 01/20/20 02/10/20 History


 


clonazePAM [KlonoPIN] 1 mg PO BID 01/20/20 02/10/20 History


 


diphenhydrAMINE HCL [Benadryl] 25 mg PO HS 01/20/20 02/10/20 History


 


haloperidoL [Haldol] 5 mg PO QID 01/20/20 02/10/20 History


 


lisinopriL [Zestril] 2.5 mg PO QAM 01/20/20 02/10/20 History


 


metFORMIN HCL 1,000 mg PO BID 01/20/20 02/10/20 History


 


Aspirin 81 mg PO DAILY 01/21/20 02/10/20 History








                                    Allergies











Allergy/AdvReac Type Severity Reaction Status Date / Time


 


desvenlafaxine [From Pristiq] AdvReac  Itching Verified 21 11:38


 


lamotrigine [From Lamictal] AdvReac  Itching Verified 21 11:38














Surgical - Exam


                                   Vital Signs











Temp Pulse Resp BP Pulse Ox


 


 98.1 F   86   18   126/84   99 


 


 21 11:39  21 11:39  21 11:39  21 11:39  21 11:39














BMI 27.9





- General


no distress





- Eyes


normal ocular movement





- ENT


normal pinna, normal nares





- Neck


no masses, trachea midline





- Respiratory


normal expansion, normal respiratory effort, clear to auscultation





- Cardiovascular


Rhythm: regular


Heart Sounds: normal: S1, S2





- Abdomen


Abdomen: soft





- Integumentary





normal turgor





- Neurologic


no disoriented, no combative





- Musculoskeletal


normal gait





- Psychiatric


oriented to time, oriented to person, oriented to place, speech is normal, 

memory intact





breast exam:


BRA: 36C


inspection: bilateral grade 2 ptosis


palpation:


right breast: Multiple positional exam fibrocystic changes, no dominant masses 

or nodules of concern, no nipple discharge of concern on examination


Right axilla: No adenopathy of concern


Left breast: Multi-positional exam fibrocystic changes, no dominant masses or 

nodules of concern, no nipple discharge of concern


Left axilla: No adenopathy of concern





Results





Mammogram and ultrasound personally reviewed with Dr. Aldrich there are some 

dilated ducts on ultrasound 





Assessment and Plan


Assessment: 





Impression:


1.  Bilateral nipple discharge only elicited with manipulation/suspect this is 

fibrocystic in nature


2.  No bloody nipple discharge


3.  Fibrocystic breast changes


4.  emphysema


5.  Bipolar


6.  Diabetes


7.  Hypothyroid


8.  Nicotine usage


9.  Caffeine consumption daily





Plan:


1.  Probable fibrocystic bilateral nipple discharge, at this time nothing which 

would warrant an interventional biopsy


2.  Patient strongly encouraged to stop smoking and stop caffeine consumption


3.  Repeat bilateral ultrasound in 6 months with physician exam at that time


4.  Patient reassured and in less the drainage becomes spontaneous and or is 

bloody would follow conservatively





Cc: Dr. Stromberg





Discussion regarding the effects of nicotine and caffeine of fibrocystic breast 

disease.  The patient is going to do her best to modify her lifestyle.  If she 

has any questions or concerns she will see me sooner otherwise I'll see her in 6

months with repeat bilateral ultrasound.

## 2021-11-01 ENCOUNTER — HOSPITAL ENCOUNTER (OUTPATIENT)
Dept: HOSPITAL 47 - RADUSWWP | Age: 56
Discharge: HOME | End: 2021-11-01
Attending: SURGERY
Payer: COMMERCIAL

## 2021-11-01 DIAGNOSIS — Z78.0: ICD-10-CM

## 2021-11-01 DIAGNOSIS — Z80.3: ICD-10-CM

## 2021-11-01 DIAGNOSIS — R92.8: Primary | ICD-10-CM

## 2021-11-01 NOTE — USB
Reason for exam: follow-up at short interval from prior study.



History:

Patient is postmenopausal.

Family history of breast cancer in maternal aunt.



Physical Findings:

Nurse Summary: nipple discharge for years, nodular (nurse ms).



US Breast Limited BILAT

Technologist: Piedad Abbott

Left limited breast ultrasound including focal area of concern, retroareolar and 

axilla demonstrates a 0.3 x 0.3 x 0.2cm possible papilloma at the nipple and 

debris in duct at the nipple.



Right limited breast ultrasound including focal area of concern, retroareolar and 

axilla demonstrates no cystic or solid lesion seen.



These results were verbally communicated with the patient and result sheet given 

to the patient on 11/1/21.





ASSESSMENT: Suspicious, BI-RAD 4



RECOMMENDATION:

Surgical consultation of the left breast.



Called office with mammographic findings and has scheduled an appointment for the 

patient for 11/5/21 at 11:40 with Dr. Alexandre.





PRELIMINARY REPORT CALLED AND FAXED TO DR. ALEXANDRE ON 11/1/21.

## 2021-11-05 ENCOUNTER — HOSPITAL ENCOUNTER (OUTPATIENT)
Dept: HOSPITAL 47 - WWCWWP | Age: 56
End: 2021-11-05
Attending: SURGERY
Payer: COMMERCIAL

## 2021-11-05 VITALS
SYSTOLIC BLOOD PRESSURE: 117 MMHG | DIASTOLIC BLOOD PRESSURE: 82 MMHG | HEART RATE: 95 BPM | RESPIRATION RATE: 18 BRPM | TEMPERATURE: 98.5 F

## 2021-11-05 DIAGNOSIS — J43.9: ICD-10-CM

## 2021-11-05 DIAGNOSIS — F17.200: ICD-10-CM

## 2021-11-05 DIAGNOSIS — F15.20: ICD-10-CM

## 2021-11-05 DIAGNOSIS — E03.9: ICD-10-CM

## 2021-11-05 DIAGNOSIS — F31.9: ICD-10-CM

## 2021-11-05 DIAGNOSIS — N60.12: ICD-10-CM

## 2021-11-05 DIAGNOSIS — N64.52: Primary | ICD-10-CM

## 2021-11-05 DIAGNOSIS — E11.9: ICD-10-CM

## 2021-11-05 DIAGNOSIS — Z88.8: ICD-10-CM

## 2021-11-05 NOTE — P.PN
Subjective


Progress Note Date: 21


Principal diagnosis: 





Bilateral nipple discharge


 Ayana is a 55 year old white female initially seen regarding bilateral nipple 

discharge.  The discharge was white and yellow in color.  She had not noted any 

blood.  She had not noted any lumps masses or nodules in her breast.  She states

that her breasts are sore but no real pain.  She had [place] 

[place] [address].  This was felt to be incomplete and bilateral breast 

ultrasound was recommended.  On bilateral ultrasound the findings were as 

follows,


Right breast: Focal area of concern demonstrates a dilated duct at the posterior

nipple


Left breast ultrasound: Focal area of concern dilated duct at the posterior 

nipple


Surgical consultation was recommended.  The patient also had a [place]

[place] [address] this was felt to be benign and annual screening was recommended.





The patient states she has had the nipple discharge for about 10 years. It 

recently changed in color to more yellow. It only comes with manipulation, it is

not spontaneous.  








21





The patient had a repeat bilateral breast ultrasound.  On the right side no 

cystic or solid lesions of concern were noted.  On the left side is 0.3 x 0.3 cm

possible papilloma was noted.


The patient states after her last visit that the nipple discharge stopped but 

today when she manipulates the breast she is able to have discharge from both 

the right and left nipple areas.  The right side is clear in nature and the left

side appears to be clear also.





Caffiene: was 3 cups/day down to 1 cup per day


nicotine: was 1 PPD/41 years now < 1/PPD


chocolate: rare





Family history:


Paternal aunt: Breast cancer


Mother: Uterine cancer


Maternal grandmother: Uterine cancer








Hormonal History:


menarche: 11


, breast fed: no, age at birth: 24


menopause: ablation at 40


BCP: not used


hormones: none





Past surgical history:


laproscpic exam


Umbilical hernia repair





Medical history:


emphysema


bipolar


DM


hypothyroid





Social history:


Nicotine: One pack per day for 41 years


Alcohol: Negative


Drugs: Negative











- Constitutional


Constitutional: Denies chills, Denies fever





- EENT


Eyes: denies blurred vision, denies pain


Ears: bilateral: tinnitus, deny: decreased hearing


Ears, nose, mouth and throat: Reports sore throat, Denies headache





- Breasts


Breasts: bilateral: as per HPI





- Cardiovascular


Cardiovascular: Denies chest pain, Denies shortness of breath





- Respiratory


Comment: 





smoker





- Gastrointestinal


Gastrointestinal: Denies abdominal pain, Denies diarrhea, Denies nausea, Denies 

vomiting





- Genitourinary (Female)


Genitourinary: Reports kidney stones





- Menstruation


Menstruation: Reports postmenopausal





- Musculoskeletal


Musculoskeletal: Denies myalgias





- Integumentary


Integumentary: Denies pruritus, Denies rash





- Neurological


Neurological: Denies numbness, Denies weakness





- Psychiatric


Comment: 





bipolar





- Endocrine


Endocrine: Reports fatigue, Denies weight change





- Hematologic/Lymphatic


Comment: 





none





- Allergic/Immunologic


Allergic/Immunologic: Reports as per HPI








Objective





- Vital Signs


Vital signs: 


                                   Vital Signs











Temp  98.5 F   21 11:53


 


Pulse  95   21 11:53


 


Resp  18   21 11:53


 


BP  117/82   21 11:53


 


Pulse Ox  98   21 11:53








                                 Intake & Output











 21





 18:59 06:59 18:59


 


Weight   77.111 kg














- Constitutional


General appearance: Present: cooperative





- EENT


Eyes: Present: EOMI


ENT: Present: hearing grossly normal





- Neck


Neck: Present: normal ROM





- Respiratory


Respiratory: bilateral: CTA





- Cardiovascular


Heart sounds: normal: S1, S2





- Gastrointestinal


General gastrointestinal: Present: soft





- Integumentary


Integumentary: Present: normal turgor





- Musculoskeletal


Musculoskeletal: Present: gait normal





- Psychiatric


Psychiatric: Present: A&O x's 3, appropriate affect, intact judgment & insight





- Additional findings


Additional findings: 





Breast Exam:


BRA: patient does not wear a bra


inspection: Bilateral grade 2 ptosis


Palpation:


Right breast: Multi-positional exam fibrocystic changes, discharge with 

manipulation from several ducts non bloody


Right axilla: No adenopathy of concern


Left breast: Multi-positional exam fibrocystic changes discharge with 

manipulation from a superior duct nonbloody


Left axilla: No adenopathy of concern





Guaiac testing of discharge from both nipple areas was performed and is guaiac-

negative 





Assessment and Plan


Assessment: 





Impression:


1.  Bilateral nipple discharge only elicited with manipulation/suspected this 

was fibrocystic in nature however on most recent ultrasound the patient is noted

to have a nodule the behind the left nipple areolar complex which may be 

consistent with a papilloma


2.  No bloody nipple discharge


3.  Fibrocystic breast changes


4.  Emphysema


5.  Bipolar


6.  Diabetes


7.  Hypothyroid


8.  Nicotine used


9.  Caffeine consumption





Plan:


1.  Patient has decreased her smoking


2.  Patient has decreased caffeine intake


3.  Decreased bilateral nipple discharge


4.  Most recent ultrasound reveals probable papilloma in the left nipple areolar

region recommend radiographic guided biopsy to rule out a malignancy prior to 

intervention





After discussion with Dr. Owens from radiology ultrasound core biopsy prior to

any operative intervention was recommended.  This will be scheduled in the near 

future.  Risks and benefits of the procedure were discussed with the patient.  

Risks include but are not limited to bleeding, infection, reaction to the 

anesthetic.  Additionally there is a risk that the area would not be adequately 

sampled and additional procedure may be needed.





CC: Dr. Lay

## 2021-12-08 ENCOUNTER — HOSPITAL ENCOUNTER (OUTPATIENT)
Dept: HOSPITAL 47 - RADUSWWP | Age: 56
End: 2021-12-08
Attending: SURGERY
Payer: COMMERCIAL

## 2021-12-08 VITALS — TEMPERATURE: 98 F | DIASTOLIC BLOOD PRESSURE: 63 MMHG | HEART RATE: 76 BPM | SYSTOLIC BLOOD PRESSURE: 94 MMHG

## 2021-12-08 VITALS — RESPIRATION RATE: 16 BRPM

## 2021-12-08 DIAGNOSIS — Z88.8: ICD-10-CM

## 2021-12-08 DIAGNOSIS — N60.42: ICD-10-CM

## 2021-12-08 DIAGNOSIS — R92.8: ICD-10-CM

## 2021-12-08 DIAGNOSIS — N60.12: Primary | ICD-10-CM

## 2021-12-08 PROCEDURE — 19083 BX BREAST 1ST LESION US IMAG: CPT

## 2021-12-08 PROCEDURE — 77065 DX MAMMO INCL CAD UNI: CPT

## 2021-12-08 PROCEDURE — 88305 TISSUE EXAM BY PATHOLOGIST: CPT

## 2021-12-08 NOTE — USB
EXAMINATION TYPE: US biopsy breast VAD LT

 

DATE OF EXAM: 12/8/2021

 

CLINICAL HISTORY: R92.8 abn mamm.

 

TECHNIQUE: Ultrasound guided vaccuum assisted core biopsy of left breast.  

 

COMPARISON: 11/1/2021

 

FINDINGS: The ultrasound guided core biopsy procedure was explained to the patient.  The risks, benef
its, alternatives were discussed.  An informed consent was then obtained.  Previous exam is reviewed.


 

Timeout was performed.

 

The patient was placed in supine positioning for  imaging and for the procedure. The overlying skin w
as prepped with betadine and sterilely draped in usual sterile fashion.  Lidocaine 1% was used as ane
sthetic into the skin and deeper breast tissue up to area of concern in the breast.  A small skin darlene
k was made with surgical scalpel.  

 

Under ultrasound guidance, a 12-gauge vacuum assisted biopsy device was used to obtain 3 core samples
.  A biopsy clip was left in lesion.  Coil clip was placed at the biopsy site

 

Good hemostasis was obtained with direct pressure.  Discharge instructions were discussed with the pa
meena.  The patient will follow up with the referring physician for results.

 

Postprocedure mammogram: The patient was transferred to mammography for physician ordered post proced
ure mammogram for clip placement verification.  The clip is in the expected region of the biopsy.

 

The patient tolerated the procedure well without any immediate complication.  The patient was dischar
ged to home in stable condition.  

 

 

 

IMPRESSION: 

1. Successful ultrasound guided biopsy left breast. 

 

Recommendations:

1. Recommendations are pending pathology results.

## 2021-12-17 ENCOUNTER — HOSPITAL ENCOUNTER (OUTPATIENT)
Dept: HOSPITAL 47 - WWCWWP | Age: 56
End: 2021-12-17
Attending: SURGERY
Payer: COMMERCIAL

## 2021-12-17 VITALS
HEART RATE: 91 BPM | DIASTOLIC BLOOD PRESSURE: 72 MMHG | SYSTOLIC BLOOD PRESSURE: 108 MMHG | RESPIRATION RATE: 18 BRPM | TEMPERATURE: 98.3 F

## 2021-12-17 DIAGNOSIS — Z53.9: Primary | ICD-10-CM

## 2021-12-17 NOTE — P.PN
Subjective


Progress Note Date: 12/17/21


Principal diagnosis: 





fibrocystic breast changes





     Ayana is a 55-year-old white female status post left breast core biopsy on

[id number].  Pathology revealed fibrocystic changes.  The lesion is palpated and 

the nipple areolar complex on the left side.





Objective





- Vital Signs


Vital signs: 


                                   Vital Signs











Temp  98.3 F   12/17/21 11:13


 


Pulse  91   12/17/21 11:13


 


Resp  18   12/17/21 11:13


 


BP  108/72   12/17/21 11:13


 


Pulse Ox  100   12/17/21 11:13








                                 Intake & Output











 12/16/21 12/17/21 12/17/21





 18:59 06:59 18:59


 


Weight   77.111 kg














- Constitutional


General appearance: Present: cooperative





- EENT


Eyes: Present: EOMI


ENT: Present: hearing grossly normal





- Neck


Neck: Present: normal ROM





- Respiratory


Respiratory: bilateral: CTA





- Cardiovascular


Rhythm: regular


Heart sounds: normal: S1, S2





- Integumentary


Integumentary Comment(s): 





echymosis at biopsy resolving





Assessment and Plan


Assessment: 





Impression:


Patient status post core biopsy left breast lesion posterior nipple complex 

which was benign no evidence of papilloma





Plan:


Patient will follow up in 1 month to reevaluate after ecchymosis has resolved.  

Consider repeating ultrasound at that time at this time there is nothing to want

interventional biopsy





CC: Dr. Lay

## 2021-12-31 ENCOUNTER — HOSPITAL ENCOUNTER (OUTPATIENT)
Dept: HOSPITAL 47 - RADUSWWP | Age: 56
Discharge: HOME | End: 2021-12-31
Attending: INTERNAL MEDICINE
Payer: COMMERCIAL

## 2021-12-31 DIAGNOSIS — N20.0: Primary | ICD-10-CM

## 2021-12-31 DIAGNOSIS — I10: ICD-10-CM

## 2021-12-31 DIAGNOSIS — E11.9: ICD-10-CM

## 2021-12-31 PROCEDURE — 76700 US EXAM ABDOM COMPLETE: CPT

## 2021-12-31 NOTE — US
EXAMINATION TYPE: US abdomen complete

 

DATE OF EXAM: 12/31/2021

 

COMPARISON: NONE

 

CLINICAL HISTORY: R14.0 Abdominal distension (gaseous). Abdominal distension, HTN, DM

 

EXAM MEASUREMENTS:

 

Liver Length:  17.7 cm   

Gallbladder Wall:  0.3 cm   

CBD:  0.6 cm

Spleen:  9.3 x 3.4 cm   

Right Kidney:  12.8 x 5.8 x 5.2 cm 

Left Kidney:  12.3 x 5.9 x 5.3 cm   

 

 

 

Pancreas:  wnl

Liver:  wnl  

Gallbladder:  wnl

**Evidence for sonographic Dyer's sign:  No

CBD:  wnl 

Spleen:  wnl   

Right Kidney:  No hydronephrosis or masses seen   

Left Kidney:  No hydronephrosis, echogenic foci lower pole with twinkle artifact measuring 0.6 x 0.5 
x 0.4cm    

Upper IVC:  wnl  

Abd Aorta:  wnl

 

Left lower pole kidney stone 

 

The liver is homogenous.  The intrahepatic portion of the IVC and proximal abdominal aorta are within
 normal limits.  There is no evidence of cholelithiasis.  Common bile duct is unremarkable.  The visu
alized portions of the pancreas are homogenous.  The spleen is unremarkable.  Kidneys are symmetric a
nd free of hydronephrosis.  No renal lesions are seen.

 

IMPRESSION:

 

Nonobstructing left renal calculus.

## 2022-02-04 ENCOUNTER — HOSPITAL ENCOUNTER (OUTPATIENT)
Dept: HOSPITAL 47 - WWCWWP | Age: 57
End: 2022-02-04
Attending: SURGERY
Payer: COMMERCIAL

## 2022-02-04 VITALS
HEART RATE: 84 BPM | DIASTOLIC BLOOD PRESSURE: 77 MMHG | SYSTOLIC BLOOD PRESSURE: 115 MMHG | TEMPERATURE: 98.1 F | RESPIRATION RATE: 18 BRPM

## 2022-02-04 DIAGNOSIS — Z53.9: Primary | ICD-10-CM

## 2022-02-24 ENCOUNTER — HOSPITAL ENCOUNTER (OUTPATIENT)
Dept: HOSPITAL 47 - RADCTMAIN | Age: 57
Discharge: HOME | End: 2022-02-24
Attending: INTERNAL MEDICINE
Payer: COMMERCIAL

## 2022-02-24 DIAGNOSIS — Z12.2: Primary | ICD-10-CM

## 2022-02-24 DIAGNOSIS — R91.8: ICD-10-CM

## 2022-02-24 DIAGNOSIS — Z87.891: ICD-10-CM

## 2022-02-24 PROCEDURE — 71271 CT THORAX LUNG CANCER SCR C-: CPT

## 2022-02-25 NOTE — CTL
EXAMINATION TYPE:  CT Low Dose Lung

 

DATE OF EXAM ORDERED: 2/24/2022

 

HISTORY: Lung cancer screening

 

CT DLP: 86 mGycm

CT CTDI: 2.3 mGy

Automated exposure control for dose reduction was used.

 

SCREENING VISIT: Follow-up

 

COMPARISON: CT dated 2/22/2021

 

TECHNIQUE: Low dose computed tomography scan was performed through the chest at 1 mm thick sections a
nd reconstructed images in multiple planes at 1 mm and 5 mm thick sections.

 

CT DIAGNOSTIC QUALITY: Satisfactory

 

FINDINGS:

LUNG NODULES: No appreciable interval changes regarding the previously seen multiple variable sized b
ilateral pulmonary nodules measuring up to 6 mm. No definitive new lung nodule identified.

 

LUNGS:

COPD: Severity: Mild

Fibrosis: Severity: Mild

Lymph nodes: None

Other findings: None

 

RIGHT PLEURAL SPACE:

Effusion: None

Calcification: None

Thickening: None

Pneumothorax: None

 

LEFT PLEURAL SPACE:

Effusion: None

Calcification: None

Thickening: None

Pneumothorax: None

 

HEART:  

Heart Size: Normal

Coronary Calcification: Moderate

Pericardial Effusion: None

 

OTHER FINDINGS:

Upper abdomen: Stable right hepatic lobe cyst. Millimetric left upper pole nonobstructing renal calcu
li.

Bony thorax: None

Supraclavicular region: None

Other: Scattered arterial atherosclerotic calcifications. Millimetric left breast calcification, plea
se correlate with breast ultrasound/mammography results.

 

IMPRESSION: Stable bilateral pulmonary nodules measuring up to 6 mm. No definite new lung nodule iden
tified.

 

CT LUNG RAD AND CT CHEST RECOMMENDATION: Category 2, benign, for continuing annual screening in 12 mo
nths.

 

S Modifier (other clinically significant findings): None

## 2022-03-10 ENCOUNTER — HOSPITAL ENCOUNTER (OUTPATIENT)
Dept: HOSPITAL 47 - ORWHC2ENDO | Age: 57
Discharge: HOME | End: 2022-03-10
Attending: SURGERY
Payer: COMMERCIAL

## 2022-03-10 VITALS — SYSTOLIC BLOOD PRESSURE: 114 MMHG | DIASTOLIC BLOOD PRESSURE: 77 MMHG | HEART RATE: 78 BPM | RESPIRATION RATE: 16 BRPM

## 2022-03-10 VITALS — TEMPERATURE: 98.6 F

## 2022-03-10 VITALS — BODY MASS INDEX: 25.2 KG/M2

## 2022-03-10 DIAGNOSIS — Z79.84: ICD-10-CM

## 2022-03-10 DIAGNOSIS — K21.9: ICD-10-CM

## 2022-03-10 DIAGNOSIS — Z88.8: ICD-10-CM

## 2022-03-10 DIAGNOSIS — Z98.890: ICD-10-CM

## 2022-03-10 DIAGNOSIS — E11.9: ICD-10-CM

## 2022-03-10 DIAGNOSIS — K29.50: Primary | ICD-10-CM

## 2022-03-10 DIAGNOSIS — F31.9: ICD-10-CM

## 2022-03-10 DIAGNOSIS — Z79.899: ICD-10-CM

## 2022-03-10 DIAGNOSIS — Z79.4: ICD-10-CM

## 2022-03-10 DIAGNOSIS — Z79.890: ICD-10-CM

## 2022-03-10 DIAGNOSIS — J44.9: ICD-10-CM

## 2022-03-10 DIAGNOSIS — G47.33: ICD-10-CM

## 2022-03-10 DIAGNOSIS — I10: ICD-10-CM

## 2022-03-10 DIAGNOSIS — K44.9: ICD-10-CM

## 2022-03-10 DIAGNOSIS — E78.5: ICD-10-CM

## 2022-03-10 DIAGNOSIS — E07.9: ICD-10-CM

## 2022-03-10 DIAGNOSIS — F17.210: ICD-10-CM

## 2022-03-10 LAB — GLUCOSE BLD-MCNC: 126 MG/DL (ref 75–99)

## 2022-03-10 PROCEDURE — 43239 EGD BIOPSY SINGLE/MULTIPLE: CPT

## 2022-03-10 PROCEDURE — 88305 TISSUE EXAM BY PATHOLOGIST: CPT

## 2022-03-10 NOTE — P.GSHP
History of Present Illness


H&P Date: 03/10/22


Chief Complaint: Anemia, GI bleed





This is a 56-year-old female who is undergoing workup for anemia with possible 

GI bleed.  Patient is today for EGD.  She's had some issues with nausea 

vomiting.  Patient developed some blood when she vomited





Past Medical History


Past Medical History: Chest Pain / Angina, COPD, Diabetes Mellitus, GERD/Reflux,

Hyperlipidemia, Hypertension, Sleep Apnea/CPAP/BIPAP, Thyroid Disorder


Additional Past Medical History / Comment(s): constipation,uses CPAP


History of Any Multi-Drug Resistant Organisms: None Reported


Past Surgical History: Heart Catheterization, Hernia Repair, Uterine Ablation


Additional Past Surgical History / Comment(s): LAPAROSCOPY.  UMB HERNIA REP


Past Anesthesia/Blood Transfusion Reactions: No Reported Reaction


Past Psychological History: Bipolar, Depression


Smoking Status: Current every day smoker


Past Alcohol Use History: None Reported


Past Drug Use History: None Reported





- Past Family History


  ** Mother


Family Medical History: No Reported History





Medications and Allergies


                                Home Medications











 Medication  Instructions  Recorded  Confirmed  Type


 


Albuterol Sulfate [Ventolin HFA] 1 - 2 puff INHALATION Q6H PRN 01/20/20 03/10/22

History


 


Atorvastatin [Lipitor] 80 mg PO HS 01/20/20 03/10/22 History


 


Dulaglutide [Trulicity] 4.25 mg SQ WE 01/20/20 03/10/22 History


 


Insulin Glargine,Hum.rec.anlog 30 unit SQ AC-SUPPER 01/20/20 03/10/22 History





[Basaglar Kwikpen U-100]    


 


Levothyroxine Sodium [Synthroid] 100 mcg PO QAM 01/20/20 03/10/22 History


 


OLANZapine [ZyPREXA] 22.5 mg PO QAM 01/20/20 03/10/22 History


 


Pantoprazole Sodium [Protonix] 40 mg PO QAM 01/20/20 03/10/22 History


 


Sertraline [Zoloft] 100 mg PO BID 01/20/20 03/10/22 History


 


clonazePAM [KlonoPIN] 1 mg PO BID 01/20/20 03/10/22 History


 


haloperidoL [Haldol] 5 mg PO TID 01/20/20 03/10/22 History


 


lisinopriL [Zestril] 2.5 mg PO QAM 01/20/20 03/10/22 History


 


metFORMIN HCL [Glucophage] 1,000 mg PO DAILY 01/20/20 03/10/22 History


 


Fenofibrate Nanocrystallized 145 mg PO DAILY 05/14/21 03/10/22 History





[Fenofibrate]    


 


Potassium Chloride [Potassium 8 meq PO DAILY 12/08/21 03/10/22 History





Chloride ER]    


 


Empagliflozin [Jardiance] 25 mg PO DAILY 03/08/22 03/10/22 History








                                    Allergies











Allergy/AdvReac Type Severity Reaction Status Date / Time


 


desvenlafaxine [From Pristiq] Allergy  Itching Verified 03/10/22 07:52


 


lamotrigine [From Lamictal] Allergy  Itching Verified 03/10/22 07:52














Surgical - Exam


                                   Vital Signs











Temp Pulse Resp BP Pulse Ox


 


 98.6 F   88   16   119/83   98 


 


 03/10/22 07:50  03/10/22 07:50  03/10/22 07:50  03/10/22 07:50  03/10/22 07:50














- General


well developed, well nourished, no distress





- Eyes


PERRL





- ENT


normal pinna





- Neck


no masses





- Respiratory


normal expansion





- Cardiovascular


Rhythm: regular





- Abdomen


Abdomen: soft, non tender





Assessment and Plan


Assessment: 





Anemia GI bleed.  We'll perform EGD.

## 2022-03-10 NOTE — P.OP
Date of Procedure: 03/10/22


Preoperative Diagnosis: 


Upper GI bleed


Postoperative Diagnosis: 


Mild antral gastritis


Procedure(s) Performed: 


EGD


Anesthesia: MAC


Surgeon: Kvng Rojas


Pathology: other (Antrum)


Condition: stable


Disposition: PACU


Description of Procedure: 


The patient's placed on the endoscopy table in the lateral position.  She 

received IV sedation.  The gastroscope placed oropharynx passed into the 

esophagus into the stomach.  Scope was then placed through the pylorus.  The 

first and second portion of the duodenum appeared normal.  Scope was then 

brought back the antrum and this was mildly inflamed.  A biopsies was performed.

 Scope was then retroflexed and the remainder the stomach appeared normal.  The 

GE junction was at 40 cm.  The distal esophagus appeared normal.  Proximal 

esophagus appeared normal.  The scope was withdrawn for patient.





There is no evidence of any upper GI bleed.  The source of bleeding could not be

determined on EGD today.

## 2022-04-01 ENCOUNTER — HOSPITAL ENCOUNTER (OUTPATIENT)
Dept: HOSPITAL 47 - WWCWWP | Age: 57
End: 2022-04-01
Attending: SURGERY
Payer: COMMERCIAL

## 2022-04-01 VITALS
DIASTOLIC BLOOD PRESSURE: 82 MMHG | RESPIRATION RATE: 18 BRPM | TEMPERATURE: 97.8 F | HEART RATE: 81 BPM | SYSTOLIC BLOOD PRESSURE: 138 MMHG

## 2022-04-01 DIAGNOSIS — F17.210: ICD-10-CM

## 2022-04-01 DIAGNOSIS — Z88.8: ICD-10-CM

## 2022-04-01 DIAGNOSIS — E11.9: ICD-10-CM

## 2022-04-01 DIAGNOSIS — E03.9: ICD-10-CM

## 2022-04-01 DIAGNOSIS — N60.11: Primary | ICD-10-CM

## 2022-04-01 DIAGNOSIS — J43.9: ICD-10-CM

## 2022-04-01 DIAGNOSIS — F31.9: ICD-10-CM

## 2022-04-01 NOTE — P.PN
Subjective


Progress Note Date: 22


Principal diagnosis: 





Nipple discharge/ultrasound abnormality left post nipple area


Bilateral nipple discharge


 Ayana is a 55 year old white female initially seen regarding bilateral nipple 

discharge.  The discharge was white and yellow in color.  She had not noted any 

blood.  She had not noted any lumps masses or nodules in her breast.  She states

that her breasts are sore but no real pain.  She had a bilateral mammogram 

[place] on [address].  This was felt to be incomplete and bilateral breast 

ultrasound was recommended.  On bilateral ultrasound the findings were as 

follows,


Right breast: Focal area of concern demonstrates a dilated duct at the posterior

nipple


Left breast ultrasound: Focal area of concern dilated duct at the posterior 

nipple


Surgical consultation was recommended.  The patient also had a lung CT performed

[place] [address] this was felt to be benign and annual screening was recommended.





The patient states she has had the nipple discharge for about 10 years. It 

recently changed in color to more yellow. It only comes with manipulation, it is

not spontaneous.  








21





The patient had a repeat bilateral breast ultrasound.  On the right side no 

cystic or solid lesions of concern were noted.  On the left side is 0.3 x 0.3 cm

possible papilloma was noted.


The patient states after her last visit that the nipple discharge stopped but 

today when she manipulates the breast she is able to have discharge from both 

the right and left nipple areas.  The right side is clear in nature and the left

side appears to be clear also.





22





Ayana underwent an ultrasound-guided left lower biopsy on .  Pathology 

revealed fibrocystic changes.  She developed ecchymosis following this.  She has

no nipple discharge from the left side which is the side of the biopsy.  She 

does have right nipple discharge however.  The discharge on the right is white w

ith no blood.  This has been going on since she has been on Haldol which is been

many years. 





The patient is not complaining of any lumps masses or nodules of concern in 

either breast.





I have reviewed in detail the patient's ultrasound-guided core biopsy 

radiographs with Dr. Owens.  There is some concern that the nodule which was 

targeted may not of been adequately sampled.  Therefore recommendation is for 

needle localization and excision in the operating room.





Caffiene: was 3 cups/day down to 1 cup per day


nicotine: was 1 PPD/41 years now < 1/PPD


chocolate: rare





Family history:


Paternal aunt: Breast cancer


Mother: Uterine cancer


Maternal grandmother: Uterine cancer








Hormonal History:


menarche: 11


, breast fed: no, age at birth: 24


menopause: ablation at 40


BCP: not used


hormones: none





Past surgical history:


laproscpic exam


Umbilical hernia repair





Medical history:


emphysema


bipolar


DM


hypothyroid





Social history:


Nicotine: One pack per day for 41 years


Alcohol: Negative


Drugs: Negative











- Constitutional


Constitutional: Denies chills, Denies fever





- EENT


Eyes: denies blurred vision, denies pain


Ears: bilateral: tinnitus, deny: decreased hearing


Ears, nose, mouth and throat: Reports sore throat, Denies headache





- Breasts


Breasts: bilateral: as per HPI





- Cardiovascular


Cardiovascular: Denies chest pain, Denies shortness of breath





- Respiratory


Comment: 





smoker





- Gastrointestinal


Gastrointestinal: Denies abdominal pain, Denies diarrhea, Denies nausea, Denies 

vomiting





- Genitourinary (Female)


Genitourinary: Reports kidney stones





- Menstruation


Menstruation: Reports postmenopausal





- Musculoskeletal


Musculoskeletal: Denies myalgias





- Integumentary


Integumentary: Denies pruritus, Denies rash





- Neurological


Neurological: Denies numbness, Denies weakness





- Psychiatric


Comment: 





bipolar





- Endocrine


Endocrine: Reports fatigue, Denies weight change





- Hematologic/Lymphatic


Comment: 





none





- Allergic/Immunologic


Allergic/Immunologic: Reports as per HPI











Objective





- Vital Signs


Vital signs: 


                                   Vital Signs











Temp  97.8 F   22 08:55


 


Pulse  81   22 08:55


 


Resp  18   22 08:55


 


BP  138/82   22 08:55


 


Pulse Ox  98   22 08:55








                                 Intake & Output











 22





 18:59 06:59 18:59


 


Weight   79.832 kg














- Exam





BMI 26.8





- Constitutional


General appearance: Present: cooperative





- EENT


Eyes: Present: EOMI


ENT: Present: hearing grossly normal





- Neck


Neck: Present: normal ROM





- Respiratory


Respiratory: bilateral: CTA





- Cardiovascular


Rhythm: regular


Heart sounds: normal: S1, S2





- Integumentary


Integumentary: Present: normal turgor





- Musculoskeletal


Musculoskeletal: Present: gait normal





- Psychiatric


Psychiatric: Present: A&O x's 3, appropriate affect, intact judgment & insight





- Additional findings


Additional findings: 





Breast examination:


Bra: 44DD


Inspection: Bilateral grade 2 ptosis


Palpation:


Right breast: Positional exam fibrocystic changes no dominant masses or nodules 

of concern


Right axilla: No adenopathy of concern


Left breast: Multi-positional exam mild fullness under the nipple areolar 

complex no discrete dominant masses or nodules of concern


Left axilla: No adenopathy of concern





Assessment and Plan


Assessment: 





Impression:


emphysema


bipolar


DM


hypothyroid


Fibrocystic breast changes


Nipple discharge.  Right/no bloody discharge, no nipple discharge left





Plan:





Ultrasound-guided localization of the area of concern in the left breast with 

resection in the operating room, possible onco-plastic tissue transfer patient 

wishes this to be done via a mastopexy incision





Risks and benefits of procedure discussed with the patient.  Risks include but 

are not limited to bleeding, infection, reaction to the anesthetic.  The patient

understands she will be asymmetric after the mastopexy and wishes it to be done 

via that incision.  Additionally risk is that the lesion could not be adequately

sampled and further surgery may be necessary.





CC: Dr. Stromberg

## 2022-05-05 ENCOUNTER — HOSPITAL ENCOUNTER (OUTPATIENT)
Dept: HOSPITAL 47 - WWCWWP | Age: 57
End: 2022-05-05
Attending: SURGERY
Payer: COMMERCIAL

## 2022-05-05 VITALS
SYSTOLIC BLOOD PRESSURE: 128 MMHG | DIASTOLIC BLOOD PRESSURE: 85 MMHG | HEART RATE: 85 BPM | RESPIRATION RATE: 17 BRPM | TEMPERATURE: 97.9 F

## 2022-05-05 DIAGNOSIS — F17.200: ICD-10-CM

## 2022-05-05 DIAGNOSIS — N60.11: Primary | ICD-10-CM

## 2022-05-05 DIAGNOSIS — F31.9: ICD-10-CM

## 2022-05-05 DIAGNOSIS — D64.9: ICD-10-CM

## 2022-05-05 DIAGNOSIS — E11.9: ICD-10-CM

## 2022-05-05 DIAGNOSIS — J43.9: ICD-10-CM

## 2022-05-05 DIAGNOSIS — Z88.8: ICD-10-CM

## 2022-05-05 DIAGNOSIS — E03.9: ICD-10-CM

## 2022-05-05 NOTE — P.PN
Subjective


Progress Note Date: 22


Principal diagnosis: 





Radiographic abnormality left breast


Nipple discharge/ultrasound abnormality left post nipple area


Bilateral nipple discharge


 Ayana is a 55 year old white female initially seen regarding bilateral nipple 

discharge.  The discharge was white and yellow in color.  She had not noted any 

blood.  She had not noted any lumps masses or nodules in her breast.  She states

that her breasts are sore but no real pain.  She [place] mamm[place] 

[place] [address].  This was felt to be incomplete and bilateral breast 

ultrasound was recommended.  On bilateral ultrasound the findings were as 

follows,


Right breast: Focal area of concern demonstrates a dilated duct at the posterior

nipple


Left breast ultrasound: Focal area of concern dilated duct at the posterior 

nipple


Surgical consultation was recommended.  The patient also had [place]

[place] [address] this was felt to be benign and annual screening was recommended.





The patient states she has had the nipple discharge for about 10 years. It 

recently changed in color to more yellow. It only comes with manipulation, it is

not spontaneous.  








21





The patient had a repeat bilateral breast ultrasound.  On the right side no 

cystic or solid lesions of concern were noted.  On the left side is 0.3 x 0.3 cm

possible papilloma was noted.


The patient states after her last visit that the nipple discharge stopped but 

today when she manipulates the breast she is able to have discharge from both t

he right and left nipple areas.  The right side is clear in nature and the left 

side appears to be clear also.





22





Ayana underwent an ultrasound-guided [place] lower [place] on 318[address].  Pathology r

evealed fibrocystic changes.  She developed ecchymosis following this.  She has 

no nipple discharge from the left side which is the side of the biopsy.  She 

does have right nipple discharge however.  The discharge on the right is white 

with no blood.  This has been going on since she has been on Haldol which is 

been many years. 





The patient is not complaining of any lumps masses or nodules of concern in 

either breast.





I have reviewed in detail the patient's ultrasound-guided core biopsy 

radiographs with Dr. Owens.  There is some concern that the nodule which was 

targeted may not of been adequately sampled.  Therefore recommendation is for 

needle localization and excision in the operating room.








22


The patient returns at this time for preoperative evaluation.  She has not had 

any changes in her breasts.  She is not having any nipple discharge from the 

left side.  She has continued to have some white discharge from the right side 

intermittently.  She has been checked for blood and has not been any blood 

present.  She has no palpable masses or nodules in either side.  The finding was

of radiographic abnormality for which core biopsy was done and it was felt that 

the core biopsy did not adequately sampled the area of concern.  Therefore 

needle localization excisional biopsy of the lesion on the left has been 

recommended.











Caffiene: was 3 cups/day down to 1 cup per day


nicotine: was 1 PPD/41 years now < 1/PPD


chocolate: rare





Family history:


Paternal aunt: Breast cancer


Mother: Uterine cancer


Maternal grandmother: Uterine cancer








Hormonal History:


menarche: 11


, breast fed: no, age at birth: 24


menopause: ablation at 40


BCP: not used


hormones: none





Past surgical history:


laproscpic exam


Umbilical hernia repair





Medical history:


emphysema


bipolar


DM


hypothyroid


anemia





Social history:


Nicotine: One pack per day for 41 years


Alcohol: Negative


Drugs: Negative











- Constitutional


Constitutional: Denies chills, Denies fever





- EENT


Eyes: denies blurred vision, denies pain


Ears: bilateral: tinnitus, deny: decreased hearing


Ears, nose, mouth and throat: Reports sore throat, Denies headache





- Breasts


Breasts: bilateral: as per HPI





- Cardiovascular


Cardiovascular: Denies chest pain, Denies shortness of breath





- Respiratory


Comment: 





smoker





- Gastrointestinal


Gastrointestinal: Denies abdominal pain, Denies diarrhea, Denies nausea, Denies 

vomiting





- Genitourinary (Female)


Genitourinary: Reports kidney stones





- Menstruation


Menstruation: Reports postmenopausal





- Musculoskeletal


Musculoskeletal: Denies myalgias





- Integumentary


Integumentary: Denies pruritus, Denies rash





- Neurological


Neurological: Denies numbness, Denies weakness





- Psychiatric


Comment: 





bipolar





- Endocrine


Endocrine: Reports fatigue, Denies weight change





- Hematologic/Lymphatic


Comment: 





none





- Allergic/Immunologic


Allergic/Immunologic: Reports as per HPI























Objective





- Vital Signs


Vital signs: 


                                   Vital Signs











Temp  97.9 F   22 08:53


 


Pulse  85   22 08:53


 


Resp  17   22 08:53


 


BP  128/85   22 08:53


 


Pulse Ox  98   22 08:53








                                 Intake & Output











 22





 18:59 06:59 18:59


 


Weight   80.286 kg














- Exam





BMI 26.9





- Constitutional


General appearance: Present: cooperative





- EENT


Eyes: Present: EOMI


ENT: Present: hearing grossly normal





- Neck


Neck: Present: normal ROM





- Respiratory


Details: 





decreased breath sounds at the right base


Respiratory: bilateral: CTA





- Cardiovascular


Rhythm: regular


Heart sounds: normal: S1, S2





- Gastrointestinal


General gastrointestinal: Present: soft





- Integumentary


Integumentary: Present: normal turgor





- Musculoskeletal


Musculoskeletal: Present: gait normal





- Psychiatric


Psychiatric: Present: A&O x's 3, appropriate affect, intact judgment & insight





- Additional findings


Additional findings: 


Breast examination:


Bra: 44DD


Inspection: Bilateral grade 2 ptosis


Palpation:


Right breast: multi positional exam fibrocystic changes no dominant masses or 

nodules of concern


Right axilla: No adenopathy of concern


Left breast: Multi-positional exam mild fullness under the nipple areolar 

complex no discrete dominant masses or nodules of concern


Left axilla: No adenopathy of concern








Assessment and Plan


Assessment: 


Assessment and Plan


Assessment: 





Impression:


emphysema


bipolar


DM


hypothyroid


Fibrocystic breast changes


Nipple discharge.  Right/no bloody discharge, no nipple discharge left


anemia





Plan:





Ultrasound-guided localization of the area of concern in the left breast with 

resection in the operating room, although the patient would like this to be done

via a mastopexy incision her insurance has denied this, as well as an 

oncoplastic tissue transfer. 





Risks and benefits of procedure discussed with the patient.  Risks include but 

are not limited to bleeding, infection, reaction to the anesthetic.  The patient

understands and wishes to proceed  Additionally risk is that the lesion could 

not be adequately sampled and further surgery may be necessary.

## 2022-05-17 ENCOUNTER — HOSPITAL ENCOUNTER (OUTPATIENT)
Dept: HOSPITAL 47 - OR | Age: 57
Discharge: HOME | End: 2022-05-17
Attending: SURGERY
Payer: COMMERCIAL

## 2022-05-17 VITALS — HEART RATE: 77 BPM | DIASTOLIC BLOOD PRESSURE: 85 MMHG | SYSTOLIC BLOOD PRESSURE: 141 MMHG

## 2022-05-17 VITALS — RESPIRATION RATE: 16 BRPM

## 2022-05-17 VITALS — BODY MASS INDEX: 26.9 KG/M2

## 2022-05-17 VITALS — TEMPERATURE: 97 F

## 2022-05-17 DIAGNOSIS — E03.9: ICD-10-CM

## 2022-05-17 DIAGNOSIS — I10: ICD-10-CM

## 2022-05-17 DIAGNOSIS — Z80.49: ICD-10-CM

## 2022-05-17 DIAGNOSIS — F31.9: ICD-10-CM

## 2022-05-17 DIAGNOSIS — N60.42: Primary | ICD-10-CM

## 2022-05-17 DIAGNOSIS — Z79.84: ICD-10-CM

## 2022-05-17 DIAGNOSIS — J43.9: ICD-10-CM

## 2022-05-17 DIAGNOSIS — D64.9: ICD-10-CM

## 2022-05-17 DIAGNOSIS — Z79.899: ICD-10-CM

## 2022-05-17 DIAGNOSIS — Z79.890: ICD-10-CM

## 2022-05-17 DIAGNOSIS — E78.5: ICD-10-CM

## 2022-05-17 DIAGNOSIS — N60.32: ICD-10-CM

## 2022-05-17 DIAGNOSIS — F17.210: ICD-10-CM

## 2022-05-17 DIAGNOSIS — Z80.3: ICD-10-CM

## 2022-05-17 DIAGNOSIS — E11.9: ICD-10-CM

## 2022-05-17 DIAGNOSIS — G47.33: ICD-10-CM

## 2022-05-17 DIAGNOSIS — N60.82: ICD-10-CM

## 2022-05-17 DIAGNOSIS — Z98.890: ICD-10-CM

## 2022-05-17 DIAGNOSIS — Z79.4: ICD-10-CM

## 2022-05-17 DIAGNOSIS — K21.9: ICD-10-CM

## 2022-05-17 LAB
GLUCOSE BLD-MCNC: 117 MG/DL (ref 75–99)
GLUCOSE BLD-MCNC: 141 MG/DL (ref 75–99)

## 2022-05-17 PROCEDURE — 19281 PERQ DEVICE BREAST 1ST IMAG: CPT

## 2022-05-17 PROCEDURE — 76641 ULTRASOUND BREAST COMPLETE: CPT

## 2022-05-17 PROCEDURE — 76098 X-RAY EXAM SURGICAL SPECIMEN: CPT

## 2022-05-17 PROCEDURE — 88307 TISSUE EXAM BY PATHOLOGIST: CPT

## 2022-05-17 PROCEDURE — 19125 EXCISION BREAST LESION: CPT

## 2022-05-17 NOTE — P.OP
Date of Procedure: 05/17/22


Preoperative Diagnosis: 


Discordant core biopsy left breast


Postoperative Diagnosis: 


Same


Procedure(s) Performed: 


Mammographic needle localization left breast with excisional lumpectomy


Anesthesia: ELIELA


Surgeon: Hien Alexandre


Estimated Blood Loss (ml): 3


IV fluids (ml): 700


Pathology: other (Breast tissue)


Condition: stable


Disposition: same day


Indications for Procedure: 


Discordant core biopsy left breast


Operative Findings: 


Dilated ducts left breast


Description of Procedure: 


The patient was initially seen in the radiology suite.  The localization of the 

area of concern was performed.  A clip had been placed at prior core biopsy 

site.  Following this the patient was brought to the operative suite.  Following

induction of anesthesia the left breast was prepped and draped in a sterile fa

shion.  A periareolar incision was made and carried down to the hook of the 

needle.  Surrounding tissue was excised.  The specimen was approximately 4 cm x 

3 cm.  The wound was evaluated and after assured that hemostasis was attained 

titanium clips were placed.  Deep sutures were placed to close the defect.  The 

skin was closed using interrupted Vicryl sutures and the subcutaneous tissue.  A

4-0 running Monocryl was placed as a subcuticular suture.


5 mL of 2% lidocaine was injected into the area.  Steri-Strips were applied.  

The patient tolerated the procedure in stable condition.  The specimen was 

painted for orientation.  The specimen was sent to radiology for confirmation 

that the area of concern had been removed the clip was noted to be in the 

specimen.

## 2022-05-24 NOTE — MM
Risk Values: 

Rosie 5 year model risk: 1.8%.

[employer]CI Lifetime model risk: 10.0%.





Prior Study Comparison: 

[facility]

12[facility]21 [facility] 

Pathology Description: 

Approach: Medial to Lateral Needle Type: 5 cm Kopan

EXAMINATION TYPE:

US discontinued breast loc LT

MG pre op needle loc LT,

MG surgical specimen LT,



DATE OF EXAM: 5/17/2022



COMPARISON: 12/8/2021, 11/1/2021, 4/29/2021



CLINICAL HISTORY: 56-year-old female N60.42. Referred for needle localization and surgical excision

of the left retroareolar region where an intraductal filling defect was identified previously.

Benign density results may be due to undersampling.



TECHNIQUE: Needle localization with wire placement and surgical excision of area of concern in the

left breast.



FINDINGS: The procedure of needle localization with wire placement and than surgical excision was

explained to the patient. Benefits, alternatives, and risks were discussed. An informed consent was

then obtained.



We initially performed ultrasound with the intention of performing ultrasound-guided needle

localization. Ectatic duct was demonstrated but scanning was unsuccessful in identifying the filling

defect seen previously. We suspect some tissue distortion after previous biopsy. Decision is made to

proceed with mammogram guided needle localization targeting the biopsy clip.



The shortest pathway for procedure was chosen. Shortest pathway was a medial approach. The overlying

skin was prepped and draped in usual sterile fashion. Lidocaine was used as anesthetic into the skin

and subcutaneous tissue up to the level of area of concern in the subareolar region. A 5 cm Kopan's

needle was used. It was placed via a medial approach under mammographic guidance. Subsequent 90

degrees mammogram show the needle to be in satisfactory position relative to the targeted area. At

this point, wire was placed and the needle was withdrawn. The wire was fixed to patient's skin.

Images were marked for surgeon.



The patient tolerated the procedure well without any immediate complication. The patient was kept in

the radiology department for short stay after the procedure and then taken to surgery for surgical

excision.



Targeted clip and wire are identified in specimen mammogram. The patient was kept in hospital for

short stay after the procedure and then discharged home in stable condition.







IMPRESSION:

Successful, uncomplicated needle localization with wire placement and surgical excision of

previously biopsied subareolar left breast which showed benign biopsy results. Given the intraductal

filling defect seen at that time, under sampling may have occurred. Papilloma should be excluded.

Full pathology results to follow. 





Pathology Results: 

Result: Benign, Duct ectasia.

LEFT BREAST, LUMPECTOMY:  Mammary duct ectasia with periductal fibrosis, chronic inflammation and

increase in luminal foamy macrophages within focal involved ducts. Fibrocystic change with apocrine

metaplasia, sclerosing adenosis and usual ductal hyperplasia.  All margins benign. Negative for in

situ or invasive malignancy. 





Findings: 

EXAMINATION TYPE:

US discontinued breast loc LT

MG pre op needle loc LT,

MG surgical specimen LT,



DATE OF EXAM: 5/17/2022



COMPARISON: 12/8/2021, 11/1/2021, 4/29/2021



CLINICAL HISTORY: 56-year-old female N60.42. Referred for needle localization and surgical excision

of the left retroareolar region where an intraductal filling defect was identified previously.

Benign density results may be due to undersampling.



TECHNIQUE: Needle localization with wire placement and surgical excision of area of concern in the

left breast.



FINDINGS: The procedure of needle localization with wire placement and than surgical excision was

explained to the patient. Benefits, alternatives, and risks were discussed. An informed consent was

then obtained.



We initially performed ultrasound with the intention of performing ultrasound-guided needle

localization. Ectatic duct was demonstrated but scanning was unsuccessful in identifying the filling

defect seen previously. We suspect some tissue distortion after previous biopsy. Decision is made to

proceed with mammogram guided needle localization targeting the biopsy clip.



The shortest pathway for procedure was chosen. Shortest pathway was a medial approach. The overlying

skin was prepped and draped in usual sterile fashion. Lidocaine was used as anesthetic into the skin

and subcutaneous tissue up to the level of area of concern in the subareolar region. A 5 cm Kopan's

needle was used. It was placed via a medial approach under mammographic guidance. Subsequent 90

degrees mammogram show the needle to be in satisfactory position relative to the targeted area. At

this point, wire was placed and the needle was withdrawn. The wire was fixed to patient's skin.

Images were marked for surgeon.



The patient tolerated the procedure well without any immediate complication. The patient was kept in

the radiology department for short stay after the procedure and then taken to surgery for surgical

excision.



Targeted clip and wire are identified in specimen mammogram. The patient was kept in hospital for

short stay after the procedure and then discharged home in stable condition.







IMPRESSION:

Successful, uncomplicated needle localization with wire placement and surgical excision of

previously biopsied subareolar left breast which showed benign biopsy results. Given the intraductal

filling defect seen at that time, under sampling may have occurred. Papilloma should be excluded.

Full pathology results to follow. 





Overall Assessment: Benign





Management: 

Diagnostic Mammogram of the left breast in 6 months.

Electronically signed and approved by: Emerson Gramajo M.D. Radiologist

## 2022-05-24 NOTE — USB
Risk Values: 

Rosie 5 year model risk: 1.8%.

[employer]CI Lifetime model risk: 10.0%.





Prior Study Comparison: 

10/8/2019 Bilateral Screening Mammogram, [facility]. 4/29/2021 Bilateral Diagnostic Mammogram, [facility].

12/8/2021 Left Diagnostic Mammogram, [facility]. 

Pathology Description: 

Approach: Medial to Lateral Needle Type: 5 cm Kopan

EXAMINATION TYPE:

US discontinued breast loc LT

MG pre op needle loc LT,

MG surgical specimen LT,



DATE OF EXAM: 5/17/2022



COMPARISON: 12/8/2021, 11/1/2021, 4/29/2021



CLINICAL HISTORY: 56-year-old female N60.42. Referred for needle localization and surgical excision

of the left retroareolar region where an intraductal filling defect was identified previously.

Benign density results may be due to undersampling.



TECHNIQUE: Needle localization with wire placement and surgical excision of area of concern in the

left breast.



FINDINGS: The procedure of needle localization with wire placement and than surgical excision was

explained to the patient. Benefits, alternatives, and risks were discussed. An informed consent was

then obtained.



We initially performed ultrasound with the intention of performing ultrasound-guided needle

localization. Ectatic duct was demonstrated but scanning was unsuccessful in identifying the filling

defect seen previously. We suspect some tissue distortion after previous biopsy. Decision is made to

proceed with mammogram guided needle localization targeting the biopsy clip.



The shortest pathway for procedure was chosen. Shortest pathway was a medial approach. The overlying

skin was prepped and draped in usual sterile fashion. Lidocaine was used as anesthetic into the skin

and subcutaneous tissue up to the level of area of concern in the subareolar region. A 5 cm Kopan's

needle was used. It was placed via a medial approach under mammographic guidance. Subsequent 90

degrees mammogram show the needle to be in satisfactory position relative to the targeted area. At

this point, wire was placed and the needle was withdrawn. The wire was fixed to patient's skin.

Images were marked for surgeon.



The patient tolerated the procedure well without any immediate complication. The patient was kept in

the radiology department for short stay after the procedure and then taken to surgery for surgical

excision.



Targeted clip and wire are identified in specimen mammogram. The patient was kept in hospital for

short stay after the procedure and then discharged home in stable condition.







IMPRESSION:

Successful, uncomplicated needle localization with wire placement and surgical excision of

previously biopsied subareolar left breast which showed benign biopsy results. Given the intraductal

filling defect seen at that time, under sampling may have occurred. Papilloma should be excluded.

Full pathology results to follow. 





Pathology Results: 

Result: Benign, Duct ectasia.

LEFT BREAST, LUMPECTOMY:  Mammary duct ectasia with periductal fibrosis, chronic inflammation and

increase in luminal foamy macrophages within focal involved ducts. Fibrocystic change with apocrine

metaplasia, sclerosing adenosis and usual ductal hyperplasia.  All margins benign. Negative for in

situ or invasive malignancy. 





Findings: 

EXAMINATION TYPE:

US discontinued breast loc LT

MG pre op needle loc LT,

MG surgical specimen LT,



DATE OF EXAM: 5/17/2022



COMPARISON: 12/8/2021, 11/1/2021, 4/29/2021



CLINICAL HISTORY: 56-year-old female N60.42. Referred for needle localization and surgical excision

of the left retroareolar region where an intraductal filling defect was identified previously.

Benign density results may be due to undersampling.



TECHNIQUE: Needle localization with wire placement and surgical excision of area of concern in the

left breast.



FINDINGS: The procedure of needle localization with wire placement and than surgical excision was

explained to the patient. Benefits, alternatives, and risks were discussed. An informed consent was

then obtained.



We initially performed ultrasound with the intention of performing ultrasound-guided needle

localization. Ectatic duct was demonstrated but scanning was unsuccessful in identifying the filling

defect seen previously. We suspect some tissue distortion after previous biopsy. Decision is made to

proceed with mammogram guided needle localization targeting the biopsy clip.



The shortest pathway for procedure was chosen. Shortest pathway was a medial approach. The overlying

skin was prepped and draped in usual sterile fashion. Lidocaine was used as anesthetic into the skin

and subcutaneous tissue up to the level of area of concern in the subareolar region. A 5 cm Kopan's

needle was used. It was placed via a medial approach under mammographic guidance. Subsequent 90

degrees mammogram show the needle to be in satisfactory position relative to the targeted area. At

this point, wire was placed and the needle was withdrawn. The wire was fixed to patient's skin.

Images were marked for surgeon.



The patient tolerated the procedure well without any immediate complication. The patient was kept in

the radiology department for short stay after the procedure and then taken to surgery for surgical

excision.



Targeted clip and wire are identified in specimen mammogram. The patient was kept in hospital for

short stay after the procedure and then discharged home in stable condition.







IMPRESSION:

Successful, uncomplicated needle localization with wire placement and surgical excision of

previously biopsied subareolar left breast which showed benign biopsy results. Given the intraductal

filling defect seen at that time, under sampling may have occurred. Papilloma should be excluded.

Full pathology results to follow. 





Overall Assessment: Benign





Management: 

Diagnostic Mammogram of the left breast in 6 months.

Electronically signed and approved by: Emerson Gramajo M.D. Radiologist

## 2022-05-24 NOTE — MM
Risk Values: 

Rosie 5 year model risk: 1.8%.

NCI Lifetime model risk: 10.0%.





Prior Study Comparison: 

10/8/2019 Bilateral Screening Mammogram, [facility]. 4/29/2021 Bilateral Diagnostic Mammogram, [facility].

12/8/2021 Left Diagnostic Mammogram, [facility]. 

Pathology Description: 

Approach: Medial to Lateral Needle Type: 5 cm Kopan

EXAMINATION TYPE:

US discontinued breast loc LT

MG pre op needle loc LT,

MG surgical specimen LT,



DATE OF EXAM: 5/17/2022



COMPARISON: 12/8/2021, 11/1/2021, 4/29/2021



CLINICAL HISTORY: 56-year-old female N60.42. Referred for needle localization and surgical excision

of the left retroareolar region where an intraductal filling defect was identified previously.

Benign density results may be due to undersampling.



TECHNIQUE: Needle localization with wire placement and surgical excision of area of concern in the

left breast.



FINDINGS: The procedure of needle localization with wire placement and than surgical excision was

explained to the patient. Benefits, alternatives, and risks were discussed. An informed consent was

then obtained.



We initially performed ultrasound with the intention of performing ultrasound-guided needle

localization. Ectatic duct was demonstrated but scanning was unsuccessful in identifying the filling

defect seen previously. We suspect some tissue distortion after previous biopsy. Decision is made to

proceed with mammogram guided needle localization targeting the biopsy clip.



The shortest pathway for procedure was chosen. Shortest pathway was a medial approach. The overlying

skin was prepped and draped in usual sterile fashion. Lidocaine was used as anesthetic into the skin

and subcutaneous tissue up to the level of area of concern in the subareolar region. A 5 cm Kopan's

needle was used. It was placed via a medial approach under mammographic guidance. Subsequent 90

degrees mammogram show the needle to be in satisfactory position relative to the targeted area. At

this point, wire was placed and the needle was withdrawn. The wire was fixed to patient's skin.

Images were marked for surgeon.



The patient tolerated the procedure well without any immediate complication. The patient was kept in

the radiology department for short stay after the procedure and then taken to surgery for surgical

excision.



Targeted clip and wire are identified in specimen mammogram. The patient was kept in hospital for

short stay after the procedure and then discharged home in stable condition.







IMPRESSION:

Successful, uncomplicated needle localization with wire placement and surgical excision of

previously biopsied subareolar left breast which showed benign biopsy results. Given the intraductal

filling defect seen at that time, under sampling may have occurred. Papilloma should be excluded.

Full pathology results to follow. 





Pathology Results: 

Result: Benign, Duct ectasia.

LEFT BREAST, LUMPECTOMY:  Mammary duct ectasia with periductal fibrosis, chronic inflammation and

increase in luminal foamy macrophages within focal involved ducts. Fibrocystic change with apocrine

metaplasia, sclerosing adenosis and usual ductal hyperplasia.  All margins benign. Negative for in

situ or invasive malignancy. 





Findings: 

EXAMINATION TYPE:

US discontinued breast loc LT

MG pre op needle loc LT,

MG surgical specimen LT,



DATE OF EXAM: 5/17/2022



COMPARISON: 12/8/2021, 11/1/2021, 4/29/2021



CLINICAL HISTORY: 56-year-old female N60.42. Referred for needle localization and surgical excision

of the left retroareolar region where an intraductal filling defect was identified previously.

Benign density results may be due to undersampling.



TECHNIQUE: Needle localization with wire placement and surgical excision of area of concern in the

left breast.



FINDINGS: The procedure of needle localization with wire placement and than surgical excision was

explained to the patient. Benefits, alternatives, and risks were discussed. An informed consent was

then obtained.



We initially performed ultrasound with the intention of performing ultrasound-guided needle

localization. Ectatic duct was demonstrated but scanning was unsuccessful in identifying the filling

defect seen previously. We suspect some tissue distortion after previous biopsy. Decision is made to

proceed with mammogram guided needle localization targeting the biopsy clip.



The shortest pathway for procedure was chosen. Shortest pathway was a medial approach. The overlying

skin was prepped and draped in usual sterile fashion. Lidocaine was used as anesthetic into the skin

and subcutaneous tissue up to the level of area of concern in the subareolar region. A 5 cm Kopan's

needle was used. It was placed via a medial approach under mammographic guidance. Subsequent 90

degrees mammogram show the needle to be in satisfactory position relative to the targeted area. At

this point, wire was placed and the needle was withdrawn. The wire was fixed to patient's skin.

Images were marked for surgeon.



The patient tolerated the procedure well without any immediate complication. The patient was kept in

the radiology department for short stay after the procedure and then taken to surgery for surgical

excision.



Targeted clip and wire are identified in specimen mammogram. The patient was kept in hospital for

short stay after the procedure and then discharged home in stable condition.







IMPRESSION:

Successful, uncomplicated needle localization with wire placement and surgical excision of

previously biopsied subareolar left breast which showed benign biopsy results. Given the intraductal

filling defect seen at that time, under sampling may have occurred. Papilloma should be excluded.

Full pathology results to follow. 





Overall Assessment: Benign





Management: 

Diagnostic Mammogram of the left breast in 6 months.

Electronically signed and approved by: Emerson Gramajo M.D. Radiologist

## 2022-05-26 ENCOUNTER — HOSPITAL ENCOUNTER (OUTPATIENT)
Dept: HOSPITAL 47 - RADMAMWWP | Age: 57
Discharge: HOME | End: 2022-05-26
Attending: SURGERY
Payer: COMMERCIAL

## 2022-05-26 ENCOUNTER — HOSPITAL ENCOUNTER (OUTPATIENT)
Dept: HOSPITAL 47 - WWCWWP | Age: 57
End: 2022-05-26
Attending: SURGERY
Payer: COMMERCIAL

## 2022-05-26 VITALS
RESPIRATION RATE: 16 BRPM | SYSTOLIC BLOOD PRESSURE: 115 MMHG | HEART RATE: 74 BPM | DIASTOLIC BLOOD PRESSURE: 77 MMHG | TEMPERATURE: 98.3 F

## 2022-05-26 DIAGNOSIS — Z88.8: ICD-10-CM

## 2022-05-26 DIAGNOSIS — R92.8: Primary | ICD-10-CM

## 2022-05-26 DIAGNOSIS — Z80.3: ICD-10-CM

## 2022-05-26 DIAGNOSIS — N60.42: Primary | ICD-10-CM

## 2022-05-26 DIAGNOSIS — N60.12: ICD-10-CM

## 2022-05-26 DIAGNOSIS — Z78.0: ICD-10-CM

## 2022-05-26 DIAGNOSIS — F17.200: ICD-10-CM

## 2022-05-26 DIAGNOSIS — N60.82: ICD-10-CM

## 2022-05-26 PROCEDURE — 77065 DX MAMMO INCL CAD UNI: CPT

## 2022-05-26 PROCEDURE — 77061 BREAST TOMOSYNTHESIS UNI: CPT

## 2022-05-26 NOTE — P.PN
Progress Note - Text


Progress Note Date: 05/26/22








     Ayana is a 56 year old white female status post a left breast needle 

localization and lumpectomy.  Pathology revealed benign mammary duct ectasia 

with periductal fibrosis, chronic inflammation and increasing luminal foamy 

macrophages with focally involved ducts.  Fibrocystic change with apocrine 

metaplasia sclerosing adenosis usual ductal hyperplasia.  Negative for any in 

situ or invasive malignancy.





Physical examination:


Lungs clear


Heart: Regular rate and rhythm


Incision: Clean and dry





Plan:


Left breast mammogram with physician exam at that time





Cc: Dr. Lay

## 2022-05-26 NOTE — MM
Reason for Exam: Additional evaluation requested from prior study. 

Last mammogram was performed 1 year(s) and 1 month(s) ago. 





Patient History: 

Menarche at age 11. First Full-Term Pregnancy at age 24. Postmenopausal. 05/17/2022, Benign MG pre

op needle loc LT on the left side. 12/8/2021, Benign Core Biopsy on the left side.

Maternal aunt had breast cancer. 





Risk Values: 

Rosie 5 year model risk: 2.2%.

NCI Lifetime model risk: 12.5%.





Film Views: 

Right CC views were taken.

Right MLO views were taken.





Prior Study Comparison: 

10/8/2019 Bilateral Screening Mammogram, [facility]. 4/29/2021 Bilateral Diagnostic Mammogram, [facility].

12/8/2021 Left Diagnostic Mammogram, [facility]. 





Tissue Density: 

Right: There are scattered fibroglandular densities.





Findings: 

Analyzed By CAD. 

No discrete solid or cystic areas are evident within the right breast. 





Overall Assessment: Benign, BI-RAD 2





Management: 

Diagnostic Mammogram of the left breast in 6 months.

A clinical breast exam by your physician is recommended on an annual basis and results should be

correlated with mammographic findings.  This exam should not preclude additional follow-up of

suspicious palpable abnormalities.  Results were given to the patient verbally at the time of exam.



Electronically signed and approved by: Jose Owens D.O. Radiologis

## 2022-11-28 ENCOUNTER — HOSPITAL ENCOUNTER (OUTPATIENT)
Dept: HOSPITAL 47 - RADMAMWWP | Age: 57
Discharge: HOME | End: 2022-11-28
Attending: SURGERY
Payer: COMMERCIAL

## 2022-11-28 DIAGNOSIS — Z78.0: ICD-10-CM

## 2022-11-28 DIAGNOSIS — R92.8: Primary | ICD-10-CM

## 2022-11-28 DIAGNOSIS — Z80.3: ICD-10-CM

## 2022-11-28 PROCEDURE — 77061 BREAST TOMOSYNTHESIS UNI: CPT

## 2022-11-28 PROCEDURE — 77065 DX MAMMO INCL CAD UNI: CPT

## 2022-11-29 NOTE — MM
Reason for Exam: Additional evaluation requested from prior study. 

Last mammogram was performed 1 year(s) and 7 month(s) ago. 





Patient History: 

Menarche at age 11. First Full-Term Pregnancy at age 24. Postmenopausal. 05/17/2022, Benign MG pre

op needle loc LT on the left side. 12/8/2021, Benign Core Biopsy on the left side.

Maternal aunt had breast cancer at or over age 50. 





Risk Values: 

Rosie 5 year model risk: 2.2%.

NCI Lifetime model risk: 12.5%.





Prior Study Comparison: 

4/29/2021 Bilateral Diagnostic Mammogram, [facility]. 12/8/2021 Left Diagnostic Mammogram, [facility]. 5/26/2022

Right MG 3D diag mammo w/cad RT, [facility]. 





Tissue Density: 

Left: The breast tissue is heterogeneously dense. This may lower the sensitivity of mammography.





Findings: 

Analyzed By CAD. 

New surgical clips anterior left breast are noted. Benign axillary lymph nodes are redemonstrated.

No suspicious new mass or worrisome group of microcalcifications in left breast. 





Overall Assessment: Benign, BI-RAD 2





Management: 

Screening Mammogram of both breasts in 6 months.

Back on annual schedule.  Results were given to the patient verbally at the time of exam.



Electronically signed and approved by: Won Tierney M.D.

## 2023-05-30 ENCOUNTER — HOSPITAL ENCOUNTER (OUTPATIENT)
Dept: HOSPITAL 47 - RADMAMWWP | Age: 58
Discharge: HOME | End: 2023-05-30
Attending: SURGERY
Payer: COMMERCIAL

## 2023-05-30 DIAGNOSIS — Z78.0: ICD-10-CM

## 2023-05-30 DIAGNOSIS — Z12.31: Primary | ICD-10-CM

## 2023-05-30 DIAGNOSIS — Z80.3: ICD-10-CM

## 2023-05-30 PROCEDURE — 77063 BREAST TOMOSYNTHESIS BI: CPT

## 2023-05-30 PROCEDURE — 77067 SCR MAMMO BI INCL CAD: CPT

## 2023-05-31 NOTE — MM
Reason for Exam: Screening  (asymptomatic). 

Last mammogram was performed 2 year(s) and 1 month(s) ago. 





Patient History: 

Menarche at age 11. First Full-Term Pregnancy at age 24. Postmenopausal. 05/17/2022, Benign MG pre

op needle loc LT on the left side. 12/8/2021, Benign Core Biopsy on the left side.

Maternal aunt had breast cancer at or over age 50. Maternal grandmother had ovarian cancer, age 50.

Mother had breast cancer, age 31. 





Risk Values: 

Rosie 5 year model risk: 4.1%.

NCI Lifetime model risk: 20.6%.





Prior Study Comparison: 

12/8/2021 Left Diagnostic Mammogram, [facility]. 5/26/2022 Right MG 3D diag mammo w/cad RT, [facility]. 11/28/2022

Left MG 3D diag mammo w/cad LT, [facility]. 





Tissue Density: 

There are scattered fibroglandular densities.





Findings: 

Analyzed By CAD. 

Surgical clips and distortion from prior excisional biopsy in the left breast are redemonstrated.

There is occasional tiny benign-appearing round calcification in the bilateral breasts

redemonstrated. Benign-appearing bilateral axillary lymph nodes are redemonstrated.



There is no suspicious new group of microcalcifications or new suspicious mass in either breast. 





Overall Assessment: Benign, BI-RAD 2





Management: 

Screening Mammogram of both breasts in 1 year.

.



Patient should continue monthly self-breast exams.  A clinical breast exam by your physician is

recommended on an annual basis.

This exam should not preclude additional follow-up of suspicious palpable abnormalities.



Note on Rosie scores and lifetime risk:

1. A Rosie score greater than 3% is considered moderate risk. If this is the case, consider

specialist referral to assess eligibility for a risk reducing agent.

2. If overall lifetime risk for the development of breast cancer is 20% or higher, the patient may

qualify for future screening with alternating mammogram and breast MRI.



Electronically signed and approved by: Won Tierney M.D.

## 2023-06-02 ENCOUNTER — HOSPITAL ENCOUNTER (OUTPATIENT)
Dept: HOSPITAL 47 - WWCWWP | Age: 58
End: 2023-06-02
Attending: SURGERY
Payer: COMMERCIAL

## 2023-06-02 VITALS
HEART RATE: 85 BPM | TEMPERATURE: 98.1 F | RESPIRATION RATE: 18 BRPM | DIASTOLIC BLOOD PRESSURE: 72 MMHG | SYSTOLIC BLOOD PRESSURE: 107 MMHG

## 2023-06-02 DIAGNOSIS — F31.9: ICD-10-CM

## 2023-06-02 DIAGNOSIS — Z88.8: ICD-10-CM

## 2023-06-02 DIAGNOSIS — E11.9: ICD-10-CM

## 2023-06-02 DIAGNOSIS — D64.9: ICD-10-CM

## 2023-06-02 DIAGNOSIS — Z80.3: ICD-10-CM

## 2023-06-02 DIAGNOSIS — J43.9: ICD-10-CM

## 2023-06-02 DIAGNOSIS — N60.19: Primary | ICD-10-CM

## 2023-06-02 DIAGNOSIS — F17.210: ICD-10-CM

## 2023-06-02 DIAGNOSIS — Z80.59: ICD-10-CM

## 2023-06-02 DIAGNOSIS — E03.9: ICD-10-CM

## 2023-06-02 NOTE — P.PN
Subjective


Progress Note Date: 23


Principal diagnosis: 





fibrocystic breast changes


fibrocystic breast changes


A 57-year-old white female seen for breast evaluation.  The patient is status 

post a left breast biopsy on 22. This was benign and done for a discordant 

core biopsy.  She had a left breast mammogram on 22 which was BIRAD 2 

follow up after the biopsy.


She has not had any changes in her breasts.  She is not having any nipple 

discharge at this time.  She has no palpable masses or nodules in either side. 


Bilateral mammogram on 23 BIRAD 2





Rosie Risk 5 year 4.1%


NCI lifetime risk 20.6%





 We have recalculated the Rosie risk and found it to be 1.8%


 And her lifetime risk at 9.8%








Have discussed chemoprophylaxis and breast MRIs based on the prior risk 

evaluation however we feel that that risk evaluation is higher than what it was 

in actuality.  Therefore at this time the patient has declined the 

chemoprophylaxis of breast MRIs.





Caffiene: was 3 cups/day down to 1 cup per day


nicotine: was 1 PPD/41 years now < 1/PPD


chocolate: rare


BCP: no





Family history:


Paternal aunt: Breast cancer


Mother: Uterine cancer


Maternal grandmother: Uterine cancer








Hormonal History:


menarche: 11


, breast fed: no, age at birth: 24


menopause: ablation at 40


BCP: not used


hormones: none





Past surgical history:


laproscpic exam


Umbilical hernia repair


left breast open biopsy





Medical history:


emphysema


bipolar


DM


hypothyroid


anemia





Social history:


Nicotine: One pack per day for 41 years now 1/2 PPD


Alcohol: Negative


Drugs: Negative











- Constitutional


Constitutional: Denies chills, Denies fever





- EENT


Eyes: denies blurred vision, denies pain


Ears: bilateral: tinnitus, deny: decreased hearing


Ears, nose, mouth and throat: Reports sore throat, Denies headache





- Breasts


Breasts: bilateral: as per HPI





- Cardiovascular


Cardiovascular: Denies chest pain, Denies shortness of breath





- Respiratory


Comment: 





smoker





- Gastrointestinal


Gastrointestinal: Denies abdominal pain, Denies diarrhea, Denies nausea, Denies 

vomiting





- Genitourinary (Female)


Genitourinary: Reports kidney stones





- Menstruation


Menstruation: Reports postmenopausal





- Musculoskeletal


Musculoskeletal: Denies myalgias





- Integumentary


Integumentary: Denies pruritus, Denies rash





- Neurological


Neurological: Denies numbness, Denies weakness





- Psychiatric


Comment: 





bipolar





- Endocrine


Endocrine: Reports fatigue, Denies weight change





- Hematologic/Lymphatic


Comment: 





none





- Allergic/Immunologic


Allergic/Immunologic: Reports as per HPI



































Objective





- Vital Signs


Vital signs: 


                                   Vital Signs











Temp  98.1 F   23 09:38


 


Pulse  85   23 09:38


 


Resp  18   23 09:38


 


BP  107/72   23 09:38


 


Pulse Ox  99   23 09:38


 


FiO2      








                                 Intake & Output











 23





 18:59 06:59 18:59


 


Weight   79.379 kg














- Constitutional


General appearance: Present: cooperative





- EENT


Eyes: Present: EOMI


ENT: Present: hearing grossly normal





- Neck


Neck: Present: normal ROM





- Respiratory


Respiratory: bilateral: CTA





- Cardiovascular


Rhythm: regular


Heart sounds: normal: S1, S2





- Gastrointestinal


General gastrointestinal: Present: soft





- Integumentary


Integumentary: Present: normal turgor





- Musculoskeletal


Musculoskeletal: Present: gait normal





- Psychiatric


Psychiatric: Present: A&O x's 3, appropriate affect, intact judgment & insight





- Additional findings


Additional findings: 


Breast examination:


Bra: 38C


Inspection: Bilateral grade 2 ptosis


Palpation:


Right breast: multi positional exam fibrocystic changes no dominant masses or 

nodules of concern


Right axilla: No adenopathy of concern


Left breast: Multi-positional exam mild fullness under the nipple areolar 

complex no discrete dominant masses or nodules of concern


Left axilla: No adenopathy of concern











Assessment and Plan


Assessment: 


Impression:


emphysema


bipolar


DM


hypothyroid


Fibrocystic breast changes


Nipple discharge in the past does not have any at this time.  


anemia


Status post left breast needle localization excisional biopsy/benign done  May 

2022





Plan:


Bilateral mammogram in one year with the examination at that time


Discussed stopping smoking the patient understands and will consider this





Patient understands the treatment plan and is in agreement.





CC: Dr. Lay

## 2024-02-28 ENCOUNTER — HOSPITAL ENCOUNTER (OUTPATIENT)
Dept: HOSPITAL 47 - RADCTMAIN | Age: 59
Discharge: HOME | End: 2024-02-28
Attending: INTERNAL MEDICINE
Payer: COMMERCIAL

## 2024-02-28 DIAGNOSIS — J44.9: ICD-10-CM

## 2024-02-28 DIAGNOSIS — F17.210: ICD-10-CM

## 2024-02-28 DIAGNOSIS — Z12.2: Primary | ICD-10-CM

## 2024-02-28 DIAGNOSIS — J43.9: ICD-10-CM

## 2024-02-28 PROCEDURE — 71271 CT THORAX LUNG CANCER SCR C-: CPT

## 2024-02-28 NOTE — CTL
EXAMINATION TYPE:  CT Low Dose Lung

 

DATE OF EXAM ORDERED: 2/28/2024

 

HISTORY: 58-year-old female Z12.2 LUNG CA SCREEN F17.210 PERS HX TOBACCO DEPEN. Current smoker with 4
2 pack-year history. Lung cancer screening

 

CT DLP: 81 mGycm

CT CTDI: 2.6 mGy

Automated exposure control for dose reduction was used.

 

SCREENING VISIT: Annual follow-up

 

COMPARISON: 2/27/2023

 

TECHNIQUE: Low dose computed tomography scan was performed through the chest with coronal and sagitta
l reconstructions.

 

CT DIAGNOSTIC QUALITY: Satisfactory

 

FINDINGS:

The heart is normal size without pericardial effusion. Scattered mild LAD and RCA coronary artery akash
cifications are present.

 

Aorta normal caliber with mild atherosclerotic arch calcifications and conventional arch vessel branc
ap anatomy.

 

Surgical clips left breast. No thoracic lymphadenopathy by size criteria.

 

Strandy atelectasis or scarring at the bilateral lower lungs. Moderate diffuse bronchial wall thicken
ing. Mild dependent atelectasis. Minimal emphysematous change in the visualized upper lungs. No conso
lidation or pleural effusion.

 

*  A 5 mm lingular pulmonary nodule at the midlung, axial image 102 is unchanged.

*  Punctate 3 mm subpleural pulmonary nodule lateral left base, axial image 26 is unchanged.

*  No new pulmonary nodule.

 

Tiny hiatal hernia. There may be some underlying fatty infiltration of the liver.  Unchanged nodulari
ty left adrenal gland measuring 1.2 cm suggesting a benign etiology.

 

Bones: Mild anterior endplate spondylosis lower thoracic spine.

 

 

 

IMPRESSION: 

1. LungRADS 2, benign. A couple pulmonary nodules on the left measuring up to 5 mm remain unchanged.

2. COPD with mild emphysema. There could be a prominent component chronic bronchitis with superimpose
d acute bronchitis. Clinically correlate. Recommend smoking cessation.

 

CT LUNG RAD AND CT CHEST RECOMMENDATION: Lung-Rad 2 Benign Appearance or Behavior: Continue annual sc
reening with LDCT in 12 months.

 

S Modifier (other clinically significant findings): None

## 2024-06-03 ENCOUNTER — HOSPITAL ENCOUNTER (OUTPATIENT)
Dept: HOSPITAL 47 - RADMAMWWP | Age: 59
Discharge: HOME | End: 2024-06-03
Attending: SURGERY
Payer: COMMERCIAL

## 2024-06-03 DIAGNOSIS — Z78.0: ICD-10-CM

## 2024-06-03 DIAGNOSIS — Z80.3: ICD-10-CM

## 2024-06-03 DIAGNOSIS — Z12.31: Primary | ICD-10-CM

## 2024-06-03 PROCEDURE — 77063 BREAST TOMOSYNTHESIS BI: CPT

## 2024-06-03 PROCEDURE — 77067 SCR MAMMO BI INCL CAD: CPT

## 2024-06-03 NOTE — MM
Reason for Exam: Screening  (asymptomatic). 

Last mammogram was performed 1 year(s) and 1 month(s) ago. 





Patient History: 

Menarche at age 11. First Full-Term Pregnancy at age 24. Postmenopausal. 05/17/2022, Benign MG pre

op needle loc LT on the left side. 12/8/2021, Benign Core Biopsy on the left side.

Paternal aunt had breast cancer at or over age 50. Maternal grandmother had endometrial cancer, age

50. Mother had endometrial cancer. 





Risk Values: 

Rosie 5 year model risk: 2.4%.

NCI Lifetime model risk: 11.9%.





Prior Study Comparison: 

5/26/2022 Right MG 3D diag mammo w/cad RT, [facility]. 11/28/2022 Left MG 3D diag mammo w/cad LT, [facility].

5/30/2023 Bilateral MG 3D screening mammo w/cad, [facility]. 





Tissue Density: 

The breasts are heterogeneously dense, which may obscure small masses.





Findings: 

Analyzed By CAD. 

Postexcisional changes left breast. Areas of asymmetric density are unchanged. There is no

suspicious group of microcalcifications or new suspicious mass in either breast. 





Overall Assessment: Benign, BI-RAD 2





Management: 

Screening Mammogram of both breasts in 1 year.

Further clinical management of patient's left breast pain at the nipple.



Patient should continue monthly self-breast exams.  A clinical breast exam by your physician is

recommended on an annual basis.

This exam should not preclude additional follow-up of suspicious palpable abnormalities.



Note on Rosie scores and lifetime risk:

1. A Rosie score greater than 3% is considered moderate risk. If this is the case, consider

specialist referral to assess eligibility for a risk reducing agent.

2. If overall lifetime risk for the development of breast cancer is 20% or higher, the patient may

qualify for future screening with alternating mammogram and breast MRI.



Electronically signed and approved by: Emerson Gramajo M.D. Radiologist

## 2024-06-17 ENCOUNTER — HOSPITAL ENCOUNTER (OUTPATIENT)
Dept: HOSPITAL 47 - WWCWWP | Age: 59
End: 2024-06-17
Attending: SURGERY
Payer: COMMERCIAL

## 2024-06-17 VITALS
SYSTOLIC BLOOD PRESSURE: 136 MMHG | HEART RATE: 93 BPM | DIASTOLIC BLOOD PRESSURE: 89 MMHG | TEMPERATURE: 98.4 F | RESPIRATION RATE: 17 BRPM

## 2024-06-17 DIAGNOSIS — Z79.899: ICD-10-CM

## 2024-06-17 DIAGNOSIS — N60.11: Primary | ICD-10-CM

## 2024-06-17 DIAGNOSIS — F31.9: ICD-10-CM

## 2024-06-17 DIAGNOSIS — F17.210: ICD-10-CM

## 2024-06-17 DIAGNOSIS — Z79.85: ICD-10-CM

## 2024-06-17 DIAGNOSIS — Z79.890: ICD-10-CM

## 2024-06-17 DIAGNOSIS — Z98.890: ICD-10-CM

## 2024-06-17 DIAGNOSIS — N64.52: ICD-10-CM

## 2024-06-17 DIAGNOSIS — D64.9: ICD-10-CM

## 2024-06-17 DIAGNOSIS — Z80.3: ICD-10-CM

## 2024-06-17 DIAGNOSIS — Z88.8: ICD-10-CM

## 2024-06-17 DIAGNOSIS — Z79.4: ICD-10-CM

## 2024-06-17 DIAGNOSIS — J43.9: ICD-10-CM

## 2024-06-17 DIAGNOSIS — E11.9: ICD-10-CM

## 2024-06-17 DIAGNOSIS — E03.9: ICD-10-CM

## 2024-06-17 DIAGNOSIS — Z79.84: ICD-10-CM

## 2024-06-17 NOTE — P.PN
Subjective


Progress Note Date: 24


Principal diagnosis: 





fibtocystic breast disease





Principal diagnosis: 





fibrocystic breast changes





A 58-year-old white female seen for breast evaluation.  The patient is status 

post a left breast biopsy on 22. This was benign and done for a discordant 

core biopsy.  She had a left breast mammogram on 22 which was BIRAD 2 

follow up after the biopsy.


She has not had any changes in her breasts.  She is not having any nipple 

discharge at this time.  She has no palpable masses or nodules in either side. 


Bilateral mammogram on 6-3-24 BIRAD 2; personally reviewed and interpreted





Rosie Risk 5 year 2.4%


NCI lifetime risk 11.9%





 


We have discussed chemoprophylaxis and the patient has declined. 





Caffiene: was 3 cups/day down to 1 cup per day


nicotine: was 1 PPD/41 years now < 1/PPD; still smoking


chocolate: rare


BCP: no





Family history:


Paternal aunt: Breast cancer


Mother: Uterine cancer


Maternal grandmother: Uterine cancer








Hormonal History:


menarche: 11


, breast fed: no, age at birth: 24


menopause: ablation at 40


BCP: not used


hormones: none





Past surgical history:


laproscpic exam


Umbilical hernia repair


left breast open biopsy





Medical history:


emphysema


bipolar


DM


hypothyroid


anemia





Social history:


Nicotine: One pack per day for 41 years now 1/2 PPD


Alcohol: Negative


Drugs: Negative











- Constitutional


Constitutional: Denies chills, Denies fever





- EENT


Eyes: denies blurred vision, denies pain


Ears: bilateral: tinnitus, deny: decreased hearing


Ears, nose, mouth and throat: Reports sore throat, Denies headache





- Breasts


Breasts: bilateral: as per HPI





- Cardiovascular


Cardiovascular: Denies chest pain, Denies shortness of breath





- Respiratory


Comment: 





smoker





- Gastrointestinal


Gastrointestinal: Denies abdominal pain, Denies diarrhea, Denies nausea, Denies 

vomiting





- Genitourinary (Female)


Genitourinary: Reports kidney stones





- Menstruation


Menstruation: Reports postmenopausal





- Musculoskeletal


Musculoskeletal: Denies myalgias





- Integumentary


Integumentary: Denies pruritus, Denies rash





- Neurological


Neurological: Denies numbness, Denies weakness





- Psychiatric


Comment: 





bipolar





- Endocrine


Endocrine: Reports fatigue, Denies weight change





- Hematologic/Lymphatic


Comment: 





none





- Allergic/Immunologic


Allergic/Immunologic: Reports as per HPI









































Objective





- Constitutional


General appearance: Present: cooperative





- EENT


Eyes: Present: EOMI


ENT: Present: hearing grossly normal





- Neck


Neck: Present: normal ROM





- Respiratory


Respiratory: bilateral: CTA





- Cardiovascular


Rhythm: regular


Heart sounds: normal: S1, S2





- Integumentary


Integumentary: Present: normal turgor





- Musculoskeletal


Musculoskeletal: Present: gait normal





- Psychiatric


Psychiatric: Present: A&O x's 3, appropriate affect, intact judgment & insight





- Additional findings


Additional findings: 


Breast examination:


Bra: 38C


Inspection: Bilateral grade 2 ptosis


Palpation:


Right breast: multi positional exam fibrocystic changes no dominant masses or 

nodules of concern


Right axilla: No adenopathy of concern


Left breast: Multi-positional exam no dominant masses or nodules of concern


Left axilla: No adenopathy of concern











Assessment and Plan


Assessment: 


Impression:


emphysema


bipolar


DM


hypothyroid


Fibrocystic breast changes


Nipple discharge in the past does not have any at this time.  


anemia


Status post left breast needle localization excisional biopsy/benign done  May 

2022





Plan:


Bilateral mammogram in one year with the examination at that time; 2025


Discussed stopping smoking the patient understands and will consider this





Patient understands the treatment plan and is in agreement.





CC: Dr. Lay

## 2025-03-03 ENCOUNTER — HOSPITAL ENCOUNTER (OUTPATIENT)
Dept: HOSPITAL 47 - RADCTMAIN | Age: 60
Discharge: HOME | End: 2025-03-03
Attending: INTERNAL MEDICINE
Payer: COMMERCIAL

## 2025-03-03 DIAGNOSIS — J43.2: ICD-10-CM

## 2025-03-03 DIAGNOSIS — K76.0: ICD-10-CM

## 2025-03-03 DIAGNOSIS — Z12.2: Primary | ICD-10-CM

## 2025-03-03 DIAGNOSIS — F17.210: ICD-10-CM

## 2025-03-03 DIAGNOSIS — I25.10: ICD-10-CM

## 2025-03-03 PROCEDURE — 71271 CT THORAX LUNG CANCER SCR C-: CPT

## 2025-03-03 NOTE — CTL
EXAMINATION TYPE: CT Low Dose Lung

 

DATE OF EXAM: 3/3/2025 3:33 PM

 

COMPARISON: 2/28/2024 

 

CLINICAL INDICATION: Female, 59 years old with history of Z12.2 LUNG CA SCR F17.210 CURRENT SMOKER; p
ersonal tobacco use, history of tobacco use.

 

TECHNIQUE: Multiple axial non-contrast scans were obtained from approximately the lung apices through
 the upper abdomen. Coronal and sagittal reformatted images were obtained. Low dose technique was uti
lized. MIP were created on a separate workstation and submitted for review.

CT DLP: 79.3 mGycm, Automated exposure control for dose reduction was used.

CT Contrast:

Contrast used: None

Oral contrast used: None

 

FINDINGS:

Lack of intravenous contrast and low dose technique limits the evaluation of the vascular and soft ti
ssue structures.

 

LUNGS: No evidence of pulmonary fibrosis. No evidence of focal consolidation, pneumothorax or pleural
 effusion. Centrilobular emphysema changes.

 

 

Nodules: 

 

    RUL: None.

    RML: None.

    RLL: Stable subpleural 5 mm series 3 image 108

    MARITO: Stable lingula. 4 mm series 3 image 110r

    LLL: Stable 4 mm cyst image 25

 

AIRWAY: Patent and unremarkable.

HEART: Size within normal limits. Severe coronary artery calcifications present.

MEDIASTINUM: No gross evidence of adenopathy.

VASCULATURE:  No aortic aneurysm. 

MUSCULOSKELETAL: Mild disc degeneration changes are present throughout the thoracolumbar spine.

SOFT TISSUES/LYMPH NODES: Postsurgical changes left breast with clips in place.

LOWER NECK: No significant findings.

UPPER ABDOMEN: Diffuse low-attenuation to the liver parenchyma. 

 

IMPRESSION:

1. No clinically significant pulmonary nodules.

2. Mild emphysema.

3. Severe coronary artery atherosclerosis.

4. Hepatic steatosis.

 

CT LUNG RAD AND CT CHEST RECOMMENDATION: Lung-Rad 2 Benign Appearance or Behavior: Continue annual sc
reening with LDCT in 12 months.

 

S Modifier (other clinically significant findings): None

 

Recommend smoking cessation (if current smoker), or continuation of smoking cessation (if prior smoke
r). Annual screening for lung cancer with low-dose computed tomography is recommended in adults ages 
55 to 77 years who have a 30 pack-year smoking history and currently smoke or have quit within the pa
st 15 years. Screening should be discontinued once a person has not smoked for 15 years or develops a
 health problem that substantially limits life expectancy or the ability or willingness to have curat
jeffery lung surgery.

 

Lung rads 2022

[email or web address]
[email or web address]

 

[email or web address], Workstation: [id number], 3/3/2025 3:42 PM

## 2025-04-25 ENCOUNTER — HOSPITAL ENCOUNTER (EMERGENCY)
Dept: HOSPITAL 47 - EC | Age: 60
Discharge: HOME | End: 2025-04-25
Payer: COMMERCIAL

## 2025-04-25 VITALS
RESPIRATION RATE: 18 BRPM | SYSTOLIC BLOOD PRESSURE: 143 MMHG | DIASTOLIC BLOOD PRESSURE: 92 MMHG | HEART RATE: 100 BPM | TEMPERATURE: 98.3 F

## 2025-04-25 DIAGNOSIS — Z88.8: ICD-10-CM

## 2025-04-25 DIAGNOSIS — F17.200: ICD-10-CM

## 2025-04-25 DIAGNOSIS — J44.9: ICD-10-CM

## 2025-04-25 DIAGNOSIS — J18.9: Primary | ICD-10-CM

## 2025-04-25 LAB
ANION GAP SERPL CALC-SCNC: 12 MMOL/L
BASOPHILS # BLD AUTO: 0.06 10*3/UL (ref 0–0.1)
BASOPHILS NFR BLD AUTO: 0.4 %
BUN SERPL-SCNC: 23 MG/DL (ref 7–17)
CALCIUM SPEC-MCNC: 9.9 MG/DL (ref 8.4–10.2)
CHLORIDE SERPL-SCNC: 101 MMOL/L (ref 98–107)
CO2 SERPL-SCNC: 21 MMOL/L (ref 22–30)
EOSINOPHIL # BLD AUTO: 0.08 10*3/UL (ref 0.04–0.35)
EOSINOPHIL NFR BLD AUTO: 0.6 %
ERYTHROCYTE [DISTWIDTH] IN BLOOD BY AUTOMATED COUNT: 4.46 10*6/UL (ref 4.1–5.2)
ERYTHROCYTE [DISTWIDTH] IN BLOOD: 13.3 % (ref 11.5–14.5)
GLUCOSE SERPL-MCNC: 283 MG/DL (ref 74–99)
HCT VFR BLD AUTO: 40.9 % (ref 37.2–46.3)
HGB BLD-MCNC: 14.1 G/DL (ref 12–15)
LYMPHOCYTES # SPEC AUTO: 2.84 10*3/UL (ref 0.9–5)
MCH RBC QN AUTO: 31.6 PG (ref 27–32)
MCHC RBC AUTO-ENTMCNC: 34.5 G/DL (ref 32–37)
MCV RBC AUTO: 91.7 FL (ref 80–97)
MONOCYTES # BLD AUTO: 0.89 10*3/UL (ref 0.2–1)
MONOCYTES NFR BLD AUTO: 6.3 %
NEUTROPHILS # BLD AUTO: 10.24 10*3/UL (ref 1.8–7.7)
PLATELET # BLD AUTO: 367 10*3/UL (ref 140–440)
POTASSIUM SERPL-SCNC: 3.7 MMOL/L (ref 3.5–5.1)
SODIUM SERPL-SCNC: 134 MMOL/L (ref 137–145)
WBC # BLD AUTO: 14.21 10*3/UL (ref 4.5–10)

## 2025-04-25 PROCEDURE — 36415 COLL VENOUS BLD VENIPUNCTURE: CPT

## 2025-04-25 PROCEDURE — 85025 COMPLETE CBC W/AUTO DIFF WBC: CPT

## 2025-04-25 PROCEDURE — 80048 BASIC METABOLIC PNL TOTAL CA: CPT

## 2025-04-25 PROCEDURE — 93005 ELECTROCARDIOGRAM TRACING: CPT

## 2025-04-25 PROCEDURE — 71046 X-RAY EXAM CHEST 2 VIEWS: CPT

## 2025-04-25 PROCEDURE — 99285 EMERGENCY DEPT VISIT HI MDM: CPT

## 2025-04-25 PROCEDURE — 83605 ASSAY OF LACTIC ACID: CPT

## 2025-05-12 ENCOUNTER — HOSPITAL ENCOUNTER (OUTPATIENT)
Dept: HOSPITAL 47 - LABWHC1 | Age: 60
Discharge: HOME | End: 2025-05-12
Attending: INTERNAL MEDICINE
Payer: COMMERCIAL

## 2025-05-12 DIAGNOSIS — E78.2: Primary | ICD-10-CM

## 2025-05-12 LAB
ALT SERPL-CCNC: 22 U/L (ref 8–44)
AST SERPL-CCNC: 17 U/L (ref 13–35)
LDLC SERPL CALC-MCNC: 84.1 MG/DL (ref 0–131)

## 2025-05-12 PROCEDURE — 80061 LIPID PANEL: CPT

## 2025-05-12 PROCEDURE — 84460 ALANINE AMINO (ALT) (SGPT): CPT

## 2025-05-12 PROCEDURE — 84450 TRANSFERASE (AST) (SGOT): CPT

## 2025-05-12 PROCEDURE — 36415 COLL VENOUS BLD VENIPUNCTURE: CPT

## 2025-07-01 ENCOUNTER — HOSPITAL ENCOUNTER (OUTPATIENT)
Dept: HOSPITAL 47 - RADMAMWWP | Age: 60
Discharge: HOME | End: 2025-07-01
Attending: SURGERY
Payer: COMMERCIAL

## 2025-07-01 DIAGNOSIS — Z78.0: ICD-10-CM

## 2025-07-01 DIAGNOSIS — Z80.3: ICD-10-CM

## 2025-07-01 DIAGNOSIS — Z12.31: Primary | ICD-10-CM

## 2025-07-01 DIAGNOSIS — R92.333: ICD-10-CM

## 2025-07-01 DIAGNOSIS — R92.1: ICD-10-CM

## 2025-07-01 PROCEDURE — 77067 SCR MAMMO BI INCL CAD: CPT

## 2025-07-01 PROCEDURE — 77063 BREAST TOMOSYNTHESIS BI: CPT

## 2025-07-21 ENCOUNTER — HOSPITAL ENCOUNTER (OUTPATIENT)
Dept: HOSPITAL 47 - LABWHC1 | Age: 60
Discharge: HOME | End: 2025-07-21
Attending: FAMILY MEDICINE
Payer: COMMERCIAL

## 2025-07-21 DIAGNOSIS — E11.65: ICD-10-CM

## 2025-07-21 DIAGNOSIS — Z79.4: ICD-10-CM

## 2025-07-21 DIAGNOSIS — E03.9: Primary | ICD-10-CM

## 2025-07-21 LAB
ALBUMIN SERPL-MCNC: 4.5 G/DL (ref 3.8–4.9)
ALBUMIN/GLOB SERPL: 2.14 RATIO (ref 1.6–3.17)
ALP SERPL-CCNC: 74 U/L (ref 41–126)
ALT SERPL-CCNC: 20 U/L (ref 8–44)
ANION GAP SERPL CALC-SCNC: 13.7 MMOL/L (ref 4–12)
AST SERPL-CCNC: 24 U/L (ref 13–35)
BILIRUB BLD-MCNC: 0.4 MG/DL (ref 0.3–1.2)
BUN SERPL-SCNC: 9.5 MG/DL (ref 9–27)
BUN/CREAT SERPL: 8.64 RATIO (ref 12–20)
CALCIUM SPEC-MCNC: 9.8 MG/DL (ref 8.7–10.3)
CHLORIDE SERPL-SCNC: 103 MMOL/L (ref 96–109)
CO2 SERPL-SCNC: 24.3 MMOL/L (ref 21.6–31.8)
CREATININE: 1.1 MG/DL (ref 0.6–1.5)
EST. AVERAGE GLUCOSE BLD GHB EST-MCNC: 186 MG/DL
GLOBULIN SER CALC-MCNC: 2.1 G/DL (ref 1.6–3.3)
GLUCOSE SERPL-MCNC: 154 MG/DL (ref 70–110)
Lab: 1119 MG/G CR (ref 0–30)
POTASSIUM SERPL-SCNC: 4.3 MMOL/L (ref 3.5–5.5)
PROT SERPL-MCNC: 6.6 G/DL (ref 6.2–8.2)
SODIUM SERPL-SCNC: 141 MMOL/L (ref 135–145)
TSH SERPL DL<=0.005 MIU/L-ACNC: 0.7 UIU/ML (ref 0.35–5.5)
URINE CREATININE: 52 MG/DL (ref 28–217)

## 2025-07-21 PROCEDURE — 82570 ASSAY OF URINE CREATININE: CPT

## 2025-07-21 PROCEDURE — 82043 UR ALBUMIN QUANTITATIVE: CPT

## 2025-07-21 PROCEDURE — 80053 COMPREHEN METABOLIC PANEL: CPT

## 2025-07-21 PROCEDURE — 83036 HEMOGLOBIN GLYCOSYLATED A1C: CPT

## 2025-07-21 PROCEDURE — 84443 ASSAY THYROID STIM HORMONE: CPT

## 2025-07-21 PROCEDURE — 36415 COLL VENOUS BLD VENIPUNCTURE: CPT
